# Patient Record
Sex: MALE | Race: WHITE | Employment: OTHER | URBAN - METROPOLITAN AREA
[De-identification: names, ages, dates, MRNs, and addresses within clinical notes are randomized per-mention and may not be internally consistent; named-entity substitution may affect disease eponyms.]

---

## 2017-01-27 ENCOUNTER — APPOINTMENT (OUTPATIENT)
Dept: GENERAL RADIOLOGY | Age: 62
End: 2017-01-27
Attending: EMERGENCY MEDICINE
Payer: COMMERCIAL

## 2017-01-27 ENCOUNTER — HOSPITAL ENCOUNTER (EMERGENCY)
Age: 62
Discharge: HOME | End: 2017-01-27
Attending: EMERGENCY MEDICINE | Admitting: EMERGENCY MEDICINE
Payer: COMMERCIAL

## 2017-01-27 VITALS
OXYGEN SATURATION: 96 % | HEIGHT: 72 IN | WEIGHT: 236.33 LBS | BODY MASS INDEX: 32.01 KG/M2 | HEART RATE: 74 BPM | SYSTOLIC BLOOD PRESSURE: 140 MMHG | DIASTOLIC BLOOD PRESSURE: 91 MMHG | RESPIRATION RATE: 16 BRPM | TEMPERATURE: 98.3 F

## 2017-01-27 DIAGNOSIS — R00.2 PALPITATIONS: Primary | ICD-10-CM

## 2017-01-27 LAB
ALBUMIN SERPL BCP-MCNC: 3.8 G/DL (ref 3.5–5)
ALBUMIN/GLOB SERPL: 1.1 {RATIO} (ref 1.1–2.2)
ALP SERPL-CCNC: 52 U/L (ref 45–117)
ALT SERPL-CCNC: 25 U/L (ref 12–78)
ANION GAP BLD CALC-SCNC: 7 MMOL/L (ref 5–15)
AST SERPL W P-5'-P-CCNC: 16 U/L (ref 15–37)
ATRIAL RATE: 79 BPM
BASOPHILS # BLD AUTO: 0 K/UL (ref 0–0.1)
BASOPHILS # BLD: 0 % (ref 0–1)
BILIRUB SERPL-MCNC: 0.6 MG/DL (ref 0.2–1)
BUN SERPL-MCNC: 18 MG/DL (ref 6–20)
BUN/CREAT SERPL: 20 (ref 12–20)
CALCIUM SERPL-MCNC: 8.4 MG/DL (ref 8.5–10.1)
CALCULATED P AXIS, ECG09: 18 DEGREES
CALCULATED R AXIS, ECG10: -26 DEGREES
CALCULATED T AXIS, ECG11: 17 DEGREES
CHLORIDE SERPL-SCNC: 109 MMOL/L (ref 97–108)
CK SERPL-CCNC: 129 U/L (ref 39–308)
CO2 SERPL-SCNC: 28 MMOL/L (ref 21–32)
CREAT SERPL-MCNC: 0.91 MG/DL (ref 0.7–1.3)
D DIMER PPP FEU-MCNC: 0.36 MG/L FEU (ref 0–0.65)
DIAGNOSIS, 93000: NORMAL
EOSINOPHIL # BLD: 0.1 K/UL (ref 0–0.4)
EOSINOPHIL NFR BLD: 3 % (ref 0–7)
ERYTHROCYTE [DISTWIDTH] IN BLOOD BY AUTOMATED COUNT: 12.9 % (ref 11.5–14.5)
GLOBULIN SER CALC-MCNC: 3.4 G/DL (ref 2–4)
GLUCOSE SERPL-MCNC: 88 MG/DL (ref 65–100)
HCT VFR BLD AUTO: 45.2 % (ref 36.6–50.3)
HGB BLD-MCNC: 14.8 G/DL (ref 12.1–17)
LYMPHOCYTES # BLD AUTO: 33 % (ref 12–49)
LYMPHOCYTES # BLD: 1.5 K/UL (ref 0.8–3.5)
MCH RBC QN AUTO: 29.8 PG (ref 26–34)
MCHC RBC AUTO-ENTMCNC: 32.7 G/DL (ref 30–36.5)
MCV RBC AUTO: 90.9 FL (ref 80–99)
MONOCYTES # BLD: 0.4 K/UL (ref 0–1)
MONOCYTES NFR BLD AUTO: 9 % (ref 5–13)
NEUTS SEG # BLD: 2.5 K/UL (ref 1.8–8)
NEUTS SEG NFR BLD AUTO: 55 % (ref 32–75)
P-R INTERVAL, ECG05: 190 MS
PLATELET # BLD AUTO: 226 K/UL (ref 150–400)
POTASSIUM SERPL-SCNC: 4.5 MMOL/L (ref 3.5–5.1)
PROT SERPL-MCNC: 7.2 G/DL (ref 6.4–8.2)
Q-T INTERVAL, ECG07: 348 MS
QRS DURATION, ECG06: 94 MS
QTC CALCULATION (BEZET), ECG08: 399 MS
RBC # BLD AUTO: 4.97 M/UL (ref 4.1–5.7)
SODIUM SERPL-SCNC: 144 MMOL/L (ref 136–145)
T4 FREE SERPL-MCNC: 0.9 NG/DL (ref 0.8–1.5)
TROPONIN I SERPL-MCNC: <0.04 NG/ML
TSH SERPL DL<=0.05 MIU/L-ACNC: 1.68 UIU/ML (ref 0.36–3.74)
VENTRICULAR RATE, ECG03: 79 BPM
WBC # BLD AUTO: 4.5 K/UL (ref 4.1–11.1)

## 2017-01-27 PROCEDURE — 82550 ASSAY OF CK (CPK): CPT

## 2017-01-27 PROCEDURE — 36415 COLL VENOUS BLD VENIPUNCTURE: CPT

## 2017-01-27 PROCEDURE — 84439 ASSAY OF FREE THYROXINE: CPT

## 2017-01-27 PROCEDURE — 71020 XR CHEST PA LAT: CPT

## 2017-01-27 PROCEDURE — 80053 COMPREHEN METABOLIC PANEL: CPT

## 2017-01-27 PROCEDURE — 99284 EMERGENCY DEPT VISIT MOD MDM: CPT

## 2017-01-27 PROCEDURE — 84443 ASSAY THYROID STIM HORMONE: CPT

## 2017-01-27 PROCEDURE — 93005 ELECTROCARDIOGRAM TRACING: CPT

## 2017-01-27 PROCEDURE — 85025 COMPLETE CBC W/AUTO DIFF WBC: CPT

## 2017-01-27 PROCEDURE — 85379 FIBRIN DEGRADATION QUANT: CPT

## 2017-01-27 PROCEDURE — 84484 ASSAY OF TROPONIN QUANT: CPT

## 2017-01-27 RX ORDER — METOPROLOL TARTRATE 25 MG/1
12.5 TABLET, FILM COATED ORAL 2 TIMES DAILY
Qty: 30 TAB | Refills: 0 | Status: SHIPPED | OUTPATIENT
Start: 2017-01-27 | End: 2020-08-01

## 2017-01-27 NOTE — ED NOTES
Patient to ED room from triage with complaint of \"palpatations\" for the past two years patient reports that they are becoming more frequent and that they keep him awake at night. Patient also reports dyspnea when laying flat.  Patient is a patient of Dr Erik Loyd, cardiologist.

## 2017-01-27 NOTE — ED PROVIDER NOTES
HPI Comments: 59 yo male with hx of tobacco use is sent to ED by PCP for palpitations. He has had these palpitations for 2 years, but they have gotten worse over the last few days. Pt reports intermittent chest pain over the last few days, but denies any currently. He has seen cardiology before and states that he has had a normal echo and stress test.  He has known PVCs, which have been though to be cause. Pt is not on a beta blocker, but has tried his wife's metoprolol and had relief with this. The history is provided by the patient. Past Medical History:   Diagnosis Date    Esophageal disorder        Past Surgical History:   Procedure Laterality Date    Pr ligmt revisn,knee,intra/extra-art           No family history on file. Social History     Social History    Marital status:      Spouse name: N/A    Number of children: N/A    Years of education: N/A     Occupational History    Not on file. Social History Main Topics    Smoking status: Former Smoker     Packs/day: 3.00     Years: 20.00     Quit date: 12/2/1986    Smokeless tobacco: Never Used    Alcohol use 3.0 oz/week     6 Cans of beer per week      Comment: occas    Drug use: No    Sexual activity: Yes     Partners: Female     Other Topics Concern    Not on file     Social History Narrative         ALLERGIES: Review of patient's allergies indicates no known allergies. Review of Systems   Constitutional: Negative for chills. HENT: Negative for congestion and sore throat. Eyes: Negative for pain. Respiratory: Shortness of breath: orthopnea. Gastrointestinal: Negative for abdominal distention, abdominal pain, nausea and vomiting. Endocrine: Negative for cold intolerance. Genitourinary: Negative for difficulty urinating and dysuria. Musculoskeletal: Negative for arthralgias. Skin: Negative for color change. Psychiatric/Behavioral: Negative for confusion.        Vitals:    01/27/17 0930   BP: (!) 184/107   Pulse: 88   Resp: 16   Temp: 98.3 °F (36.8 °C)   SpO2: 98%   Weight: 107.2 kg (236 lb 5.3 oz)   Height: 6' (1.829 m)            Physical Exam   Constitutional: He is oriented to person, place, and time. He appears well-developed and well-nourished. No distress. HENT:   Head: Normocephalic and atraumatic. Eyes: Conjunctivae and EOM are normal. Pupils are equal, round, and reactive to light. Neck: Normal range of motion. Neck supple. No tracheal deviation present. Cardiovascular:   Irregular rhythm, no m/r/g   Pulmonary/Chest: Effort normal. No respiratory distress. He has no wheezes. He has no rales. Abdominal: Soft. There is no tenderness. There is no rebound. Musculoskeletal: Normal range of motion. Neurological: He is alert and oriented to person, place, and time. Skin: Skin is dry. He is not diaphoretic. Vitals reviewed. MDM  Number of Diagnoses or Management Options     Amount and/or Complexity of Data Reviewed  Clinical lab tests: ordered and reviewed  Tests in the radiology section of CPT®: ordered and reviewed  Tests in the medicine section of CPT®: ordered and reviewed      ED Course   ED EKG interpretation:  Rhythm: normal sinus rhythm; and regular . Rate (approx.): 79; Axis: left axis deviation; P wave: normal; QRS interval: normal ; ST/T wave: normal; Other findings: multiple PVCs noted. This EKG was interpreted by Raya Gonzalez,ED Provider. Procedures    EKG interpretation: (Preliminary)  09:39  Rhythm: sinus rhythm with sinus arrhythmia and PVC's; and regular . Rate (approx.): 79; Axis: normal; GA interval: normal; QRS interval: normal ; ST/T wave: normal; Other findings: normal.  Written by Vijaya Ireland ED Scribe, as dictated by Raj Peña MD    12:03 PM  Discussed lab results with pt, no gross abnormalities. Attempted to reach patient's cardiologist Dr. Glen Corea and spoke with Dr. Pretty Scales with same practice regarding management.   At this point we have agreed to start patient on low dose metoprolol 12.5mg BID and he will follow up in clinic. LABORATORY TESTS:  Recent Results (from the past 12 hour(s))   EKG, 12 LEAD, INITIAL    Collection Time: 01/27/17  9:39 AM   Result Value Ref Range    Ventricular Rate 79 BPM    Atrial Rate 79 BPM    P-R Interval 190 ms    QRS Duration 94 ms    Q-T Interval 348 ms    QTC Calculation (Bezet) 399 ms    Calculated P Axis 18 degrees    Calculated R Axis -26 degrees    Calculated T Axis 17 degrees    Diagnosis       Sinus rhythm with sinus arrhythmia with occasional premature ventricular   complexes  Moderate voltage criteria for LVH, may be normal variant  When compared with ECG of 10-MAR-2012 11:33,  premature ventricular complexes are now present     TROPONIN I    Collection Time: 01/27/17 10:23 AM   Result Value Ref Range    Troponin-I, Qt. <0.04 <0.05 ng/mL   CK W/ REFLX CKMB    Collection Time: 01/27/17 10:23 AM   Result Value Ref Range     39 - 007 U/L   METABOLIC PANEL, COMPREHENSIVE    Collection Time: 01/27/17 10:23 AM   Result Value Ref Range    Sodium 144 136 - 145 mmol/L    Potassium 4.5 3.5 - 5.1 mmol/L    Chloride 109 (H) 97 - 108 mmol/L    CO2 28 21 - 32 mmol/L    Anion gap 7 5 - 15 mmol/L    Glucose 88 65 - 100 mg/dL    BUN 18 6 - 20 MG/DL    Creatinine 0.91 0.70 - 1.30 MG/DL    BUN/Creatinine ratio 20 12 - 20      GFR est AA >60 >60 ml/min/1.73m2    GFR est non-AA >60 >60 ml/min/1.73m2    Calcium 8.4 (L) 8.5 - 10.1 MG/DL    Bilirubin, total 0.6 0.2 - 1.0 MG/DL    ALT 25 12 - 78 U/L    AST 16 15 - 37 U/L    Alk.  phosphatase 52 45 - 117 U/L    Protein, total 7.2 6.4 - 8.2 g/dL    Albumin 3.8 3.5 - 5.0 g/dL    Globulin 3.4 2.0 - 4.0 g/dL    A-G Ratio 1.1 1.1 - 2.2     CBC WITH AUTOMATED DIFF    Collection Time: 01/27/17 10:23 AM   Result Value Ref Range    WBC 4.5 4.1 - 11.1 K/uL    RBC 4.97 4.10 - 5.70 M/uL    HGB 14.8 12.1 - 17.0 g/dL    HCT 45.2 36.6 - 50.3 %    MCV 90.9 80.0 - 99.0 FL MCH 29.8 26.0 - 34.0 PG    MCHC 32.7 30.0 - 36.5 g/dL    RDW 12.9 11.5 - 14.5 %    PLATELET 818 615 - 957 K/uL    NEUTROPHILS 55 32 - 75 %    LYMPHOCYTES 33 12 - 49 %    MONOCYTES 9 5 - 13 %    EOSINOPHILS 3 0 - 7 %    BASOPHILS 0 0 - 1 %    ABS. NEUTROPHILS 2.5 1.8 - 8.0 K/UL    ABS. LYMPHOCYTES 1.5 0.8 - 3.5 K/UL    ABS. MONOCYTES 0.4 0.0 - 1.0 K/UL    ABS. EOSINOPHILS 0.1 0.0 - 0.4 K/UL    ABS. BASOPHILS 0.0 0.0 - 0.1 K/UL   D DIMER    Collection Time: 01/27/17 10:23 AM   Result Value Ref Range    D-dimer 0.36 0.00 - 0.65 mg/L FEU   TSH, 3RD GENERATION    Collection Time: 01/27/17 10:23 AM   Result Value Ref Range    TSH 1.68 0.36 - 3.74 uIU/mL       IMAGING RESULTS:  XR CHEST PA LAT   Final Result          MEDICATIONS GIVEN:  Medications - No data to display    IMPRESSION:  Palpitations    PLAN:  1. Metoprolol 12.5 mg BID   Current Discharge Medication List      CONTINUE these medications which have NOT CHANGED    Details   pantoprazole (PROTONIX) 20 mg tablet Take 20 mg by mouth daily. LORazepam (ATIVAN) 0.5 mg tablet Take 0.5 mg by mouth as needed. 2.   Follow-up Information     None        Return to ED if worse               ------------------------------------------  Begin Attending Documentation  ------------------------------------------    Attending Attestation: I was not present during the patient's evaluation by the resident. I personally evaluated the patient including the history and physical. I have read the resident's note and agree with their history, physical and plan. HPI: Manju Marlow is a 58 y.o. male who presents ambulatory to [facility] ED with CC of palpitations x \"years,\" becoming constant x \"a few days. \" He also c/o orthopnea x \"a few days. \" Pt reports he was referred to ED by his PCP today.  He states he was informed to stop drinking caffeine after being evaluated by his Cardiologist for similar symptoms; pt reports he has not had caffeine x ~1 month without significant improvement. He reports intermittent dyspnea x last night. Pt reports hx of normal stress test, echo and Holter monitor by cardiology (Dr. Dagoberto Lunsford). 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 Pt denies hx of DVT or PE. He denies recent surgery. Pt denies hx of HTN, or currently taking any blood pressure medications. He denies ABD pain, N/V/D, CP, cough, or hemoptysis. PCP: Maribeth Cm MD   Cardiology: Dr. Dagoberto Lunsford    There are no other complaints, changes, or physical findings at this time. PE:  Gen: NAD, WD/WN   Heart: nl S1, S2, no m/r/g   Lungs: CTAB, no w/r/r   Abd: soft, nttp, ND   Ext: no swelling, no calf tenderness   Skin: no rashes   Neuro: grossly intact, no focal deficits    Assessment: Patient presents to ED with palpitations. PVCs noted on EKG. He has had unremarkable work up with cardiology including stress test and echo. He denies chest pain. Labs including Pinky and d-dimer unremarkable (do not suspect PE). Reviewed telemetry - NSR throughout ED course. Will start low dose metoprolol and have patient follow up with cardiology.     Diagnosis: Palpitations, PVCs    Katharina Garcia MD.    ------------------------------------------  End Attending Documentation  ------------------------------------------

## 2017-01-27 NOTE — DISCHARGE INSTRUCTIONS
Palpitations: Care Instructions  Your Care Instructions    Heart palpitations are the uncomfortable sensation that your heart is beating fast or irregularly. You might feel pounding or fluttering in your chest. It might feel like your heart is skipping a beat. Although palpitations may be caused by a heart problem, they also occur because of stress, fatigue, or use of alcohol, caffeine, or nicotine. Many medicines, including diet pills, antihistamines, decongestants, and some herbal products, can cause heart palpitations. Nearly everyone has palpitations from time to time. Depending on your symptoms, your doctor may need to do more tests to try to find the cause of your palpitations. Follow-up care is a key part of your treatment and safety. Be sure to make and go to all appointments, and call your doctor if you are having problems. It's also a good idea to know your test results and keep a list of the medicines you take. How can you care for yourself at home? · Avoid caffeine, nicotine, and excess alcohol. · Do not take illegal drugs, such as methamphetamines and cocaine. · Do not take weight loss or diet medicines unless you talk with your doctor first.  · Get plenty of sleep. · Do not overeat. · If you have palpitations again, take deep breaths and try to relax. This may slow a racing heart. · If you start to feel lightheaded, lie down to avoid injuries that might result if you pass out and fall down. · Keep a record of your palpitations and bring it to your next doctor's appointment. Write down:  ¨ The date and time. ¨ Your pulse. (If your heart is beating fast, it may be hard to count your pulse.)  ¨ What you were doing when the palpitations started. ¨ How long the palpitations lasted. ¨ Any other symptoms. · If an activity causes palpitations, slow down or stop. Talk to your doctor before you do that activity again. · Take your medicines exactly as prescribed.  Call your doctor if you think you are having a problem with your medicine. When should you call for help? Call 911 anytime you think you may need emergency care. For example, call if:  · You passed out (lost consciousness). · You have symptoms of a heart attack. These may include:  ¨ Chest pain or pressure, or a strange feeling in the chest.  ¨ Sweating. ¨ Shortness of breath. ¨ Pain, pressure, or a strange feeling in the back, neck, jaw, or upper belly or in one or both shoulders or arms. ¨ Lightheadedness or sudden weakness. ¨ A fast or irregular heartbeat. After you call 911, the  may tell you to chew 1 adult-strength or 2 to 4 low-dose aspirin. Wait for an ambulance. Do not try to drive yourself. · You have symptoms of a stroke. These may include:  ¨ Sudden numbness, tingling, weakness, or loss of movement in your face, arm, or leg, especially on only one side of your body. ¨ Sudden vision changes. ¨ Sudden trouble speaking. ¨ Sudden confusion or trouble understanding simple statements. ¨ Sudden problems with walking or balance. ¨ A sudden, severe headache that is different from past headaches. Call your doctor now or seek immediate medical care if:  · You have heart palpitations and:  ¨ Are dizzy or lightheaded, or you feel like you may faint. ¨ Have new or increased shortness of breath. Watch closely for changes in your health, and be sure to contact your doctor if:  · You continue to have heart palpitations. Where can you learn more? Go to http://tabatha-lisbeth.info/. Enter R508 in the search box to learn more about \"Palpitations: Care Instructions. \"  Current as of: January 27, 2016  Content Version: 11.1  © 8615-8663 [employer]. Care instructions adapted under license by [employer] (which disclaims liability or warranty for this information).  If you have questions about a medical condition or this instruction, always ask your healthcare professional. [employer] disclaims any warranty or liability for your use of this information.

## 2017-02-24 ENCOUNTER — OFFICE VISIT (OUTPATIENT)
Dept: CARDIOLOGY CLINIC | Age: 62
End: 2017-02-24

## 2017-02-24 VITALS
OXYGEN SATURATION: 94 % | WEIGHT: 234 LBS | HEART RATE: 72 BPM | DIASTOLIC BLOOD PRESSURE: 72 MMHG | BODY MASS INDEX: 31.69 KG/M2 | SYSTOLIC BLOOD PRESSURE: 110 MMHG | RESPIRATION RATE: 18 BRPM | HEIGHT: 72 IN

## 2017-02-24 DIAGNOSIS — R00.2 HEART PALPITATIONS: Primary | ICD-10-CM

## 2017-02-24 DIAGNOSIS — I49.3 VENTRICULAR PREMATURE CONTRACTIONS: ICD-10-CM

## 2017-02-24 DIAGNOSIS — I49.1 ATRIAL PREMATURE CONTRACTIONS: ICD-10-CM

## 2017-02-24 DIAGNOSIS — F15.10 CAFFEINE ABUSE, CONTINUOUS (HCC): ICD-10-CM

## 2017-02-24 NOTE — PROGRESS NOTES
Chief Complaint   Patient presents with    Appointment     6 mo appt. C/O palps rest or exertion. Not taking his Metoprolol.

## 2017-02-24 NOTE — PROGRESS NOTES
[address] [place] [address]        [phone]                             NEW PATIENT HPI/FOLLOW-UP    NAME:  Chad White   :   1955   MRN:   [id number]   PCP:  Sandoval Ding MD           Subjective: The patient is a 58y.o. year old male  who returns for a routine follow-up. Since the last visit last month, patient sent to ED [address] for worsening \"skipping,fluttering\" palpitations for which he was rx'ed low dose BB 17 as he had taken one of his wife's doses and noted some improvement. 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 Since  or longer, short and longterm monitoring have revealed APC's and PVC's not necessarily corresponding with symptoms. Was advised at last visit to call if not better after stopping caffeine. If not then advised we would try BB rx which would not only help palpitations but anxiety as well. Denies change in exercise tolerance, chest pain, edema, medication intolerance, +shortness of breath, PND/orthopnea wheezing, sputum, syncope, dizziness or light headedness. Review of Systems  General: Pt denies excessive weight gain or loss. Pt is able to conduct ADL's. Respiratory: +shortness of breath,+ PARKS, wheezing or stridor.   Cardiovascular: Denies precordial pain, +palpitations, edema or PND  Gastrointestinal: Denies poor appetite, indigestion, abdominal pain or blood in stool  Peripheral vascular: Denies claudication, leg cramps  Neuropsychiatric: Denies paresthesias,tingling,numbness,anxiety,depression,fatigue  Musculoskeletal: Denies pain,tenderness, soreness,swelling      Past Medical History:   Diagnosis Date    Esophageal disorder      Patient Active Problem List    Diagnosis Date Noted    Caffeine abuse, continuous 2016    Atrial premature contractions 10/27/2016    Ventricular premature contractions 10/27/2016    Ischemic chest pain 08/13/2015    Heart palpitations 08/13/2015      Past Surgical History:   Procedure Laterality Date    LIGMT REVISN,KNEE,INTRA/EXTRA-ART       No Known Allergies   No family history on file. Social History     Social History    Marital status:      Spouse name: N/A    Number of children: N/A    Years of education: N/A     Occupational History    Not on file. Social History Main Topics    Smoking status: Former Smoker     Packs/day: 3.00     Years: 20.00     Quit date: 12/2/1986    Smokeless tobacco: Never Used    Alcohol use 3.0 oz/week     6 Cans of beer per week      Comment: occas    Drug use: No    Sexual activity: Yes     Partners: Female     Other Topics Concern    Not on file     Social History Narrative      Current Outpatient Prescriptions   Medication Sig    pantoprazole (PROTONIX) 20 mg tablet Take 20 mg by mouth daily.  LORazepam (ATIVAN) 0.5 mg tablet Take 0.5 mg by mouth as needed.  metoprolol tartrate (LOPRESSOR) 25 mg tablet Take 0.5 Tabs by mouth two (2) times a day. Indications: palpitations     No current facility-administered medications for this visit. I have reviewed the nurses notes, vitals, problem list, allergy list, medical history, family medical, social history and medications. Objective:     Physical Exam:     Vitals:    02/24/17 1423   BP: 110/72   Pulse: 72   Resp: 18   SpO2: 94%   Weight: 234 lb (106.1 kg)   Height: 6' (1.829 m)    Body mass index is 31.74 kg/(m^2). General: Well developed, in no acute distress. HEENT: No carotid bruits, no JVD, trach is midline. Heart:  Normal S1/S2 negative S3 or S4. Regular, no murmur, gallop or rub.   Respiratory: Clear bilaterally, no wheezing or rales  Abdomen:   Soft, non-tender, bowel sounds are active.   Extremities:  No edema, normal cap refill, no cyanosis. Neuro: A&Ox3, speech clear, gait stable. Skin: Skin color is normal. No rashes or lesions. No diaphoresis. Vascular: 2+ pulses symmetric in all extremities        Data Review:       Cardiographics:    EKG: NSR,WNL    Cardiology Labs:    Results for orders placed or performed during the hospital encounter of 01/27/17   EKG, 12 LEAD, INITIAL   Result Value Ref Range    Ventricular Rate 79 BPM    Atrial Rate 79 BPM    P-R Interval 190 ms    QRS Duration 94 ms    Q-T Interval 348 ms    QTC Calculation (Bezet) 399 ms    Calculated P Axis 18 degrees    Calculated R Axis -26 degrees    Calculated T Axis 17 degrees    Diagnosis       Sinus rhythm with sinus arrhythmia with occasional premature ventricular   complexes  Moderate voltage criteria for LVH, may be normal variant  When compared with ECG of 10-MAR-2012 11:33,  premature ventricular complexes are now present  Confirmed by Sean Wolfe ([id number]) on 1/27/2017 12:50:05 PM     Results for orders placed or performed in visit on 08/13/15   CARDIAC HOLTER MONITOR, 24 HOURS    Narrative    ECG Monitor/24 hours, Complete    Reason for Holter Monitor   PALPITATIONS    Heartbeat    Slowest 51  Average 74  Fastest  118          Results:   Underlying Rhythm: Normal sinus rhythm      Atrial Arrhythmias: premature atrial contractions; occasional             AV Conduction: normal    Ventricular Arrhythmias: premature ventricular contractions;  occasional     ST Segment Analysis:non-specific changes     Symptom Correlation:  none    Comment:   occasional PAC's, PVC's.      James Newman MD                 No results found for: CHOL, CHOLX, CHLST, CHOLV, [id number], HDL, LDL, DLDL, LDLC, DLDLP, TGL, TGLX, TRIGL, [id number], TRIGP, CHHD, CHHDX    Lab Results   Component Value Date/Time    Sodium 144 01/27/2017 10:23 AM    Potassium 4.5 01/27/2017 10:23 AM    Chloride 109 01/27/2017 10:23 AM    CO2 28 01/27/2017 10:23 AM    Anion gap 7 01/27/2017 10:23 AM    Glucose 88 01/27/2017 10:23 AM    BUN 18 01/27/2017 10:23 AM    Creatinine 0.91 01/27/2017 10:23 AM BUN/Creatinine ratio 20 01/27/2017 10:23 AM    GFR est AA >60 01/27/2017 10:23 AM    GFR est non-AA >60 01/27/2017 10:23 AM    Calcium 8.4 01/27/2017 10:23 AM    Bilirubin, total 0.6 01/27/2017 10:23 AM    AST (SGOT) 16 01/27/2017 10:23 AM    Alk. phosphatase 52 01/27/2017 10:23 AM    Protein, total 7.2 01/27/2017 10:23 AM    Albumin 3.8 01/27/2017 10:23 AM    Globulin 3.4 01/27/2017 10:23 AM    A-G Ratio 1.1 01/27/2017 10:23 AM    ALT (SGPT) 25 01/27/2017 10:23 AM          Assessment:       ICD-10-CM ICD-9-CM    1. Heart palpitations R00.2 785.1 AMB POC EKG ROUTINE W/ 12 LEADS, INTER & REP      ECG EVENT RECORD MONITORING SET-UP      STRESS TEST CARDIAC   2. Atrial premature contractions I49.1 427.61 ECG EVENT RECORD MONITORING SET-UP      STRESS TEST CARDIAC   3. Ventricular premature contractions I49.3 427.69 ECG EVENT RECORD MONITORING SET-UP      STRESS TEST CARDIAC   4. Caffeine abuse, continuous F15.10 305.91 ECG EVENT RECORD MONITORING SET-UP      STRESS TEST CARDIAC         Discussion: Patient presents at this time with much anxiety regarding more frequent palpitations since last visit 2 months ago. Was seen in ED ED [facility] and started on low dose BB 1/27/17 but has not taken consistently. Have asked to hold off til routine stress test and EM as episodes unpredictable and not every day usually. Has known benign APC's and PVC's which it appears have been of concern. Will likely be maintained on BB dose adjusted as tolerated. Plan: 1. Continue same meds. Lipid profile and labs followed by PCP. 2.Encouraged to exercise to tolerance and follow low fat, low cholesterol, low sodium predominantly Plant-based (consider Mediterranean) diet. Call with questions or concerns. Will follow up any test results by phone and/or f/u here in office if needed. Ted Mason 3.Follow up: 2-3 WEEKS    I have discussed the diagnosis with the patient and the intended plan as seen in the above orders.   The patient has received an after-visit summary and questions were answered concerning future plans. I have discussed any concerning medication side effects and warnings with the patient as well.     Liliana Zarate MD  2/24/2017

## 2017-03-02 ENCOUNTER — CLINICAL SUPPORT (OUTPATIENT)
Dept: CARDIOLOGY CLINIC | Age: 62
End: 2017-03-02

## 2017-03-02 ENCOUNTER — OFFICE VISIT (OUTPATIENT)
Dept: CARDIOLOGY CLINIC | Age: 62
End: 2017-03-02

## 2017-03-02 DIAGNOSIS — I49.3 VENTRICULAR PREMATURE CONTRACTIONS: ICD-10-CM

## 2017-03-02 DIAGNOSIS — F15.10 CAFFEINE ABUSE, CONTINUOUS (HCC): ICD-10-CM

## 2017-03-02 DIAGNOSIS — I49.1 ATRIAL PREMATURE CONTRACTIONS: ICD-10-CM

## 2017-03-02 DIAGNOSIS — R00.2 HEART PALPITATIONS: ICD-10-CM

## 2017-03-03 ENCOUNTER — TELEPHONE (OUTPATIENT)
Dept: CARDIOLOGY CLINIC | Age: 62
End: 2017-03-03

## 2017-03-03 NOTE — TELEPHONE ENCOUNTER
Spoke with patient. Verified patient with two patient identifiers. Advised stress test normal.  Patient verbalized understanding.

## 2017-03-24 ENCOUNTER — OFFICE VISIT (OUTPATIENT)
Dept: CARDIOLOGY CLINIC | Age: 62
End: 2017-03-24

## 2017-03-24 ENCOUNTER — TELEPHONE (OUTPATIENT)
Dept: CARDIOLOGY CLINIC | Age: 62
End: 2017-03-24

## 2017-03-24 VITALS
HEIGHT: 72 IN | SYSTOLIC BLOOD PRESSURE: 110 MMHG | RESPIRATION RATE: 18 BRPM | DIASTOLIC BLOOD PRESSURE: 80 MMHG | OXYGEN SATURATION: 98 % | BODY MASS INDEX: 31.29 KG/M2 | WEIGHT: 231 LBS | HEART RATE: 56 BPM

## 2017-03-24 DIAGNOSIS — I49.3 SYMPTOMATIC PREMATURE VENTRICULAR CONTRACTIONS: ICD-10-CM

## 2017-03-24 DIAGNOSIS — Z72.0 TOBACCO USE: ICD-10-CM

## 2017-03-24 DIAGNOSIS — I49.1 PREMATURE ATRIAL CONTRACTIONS: ICD-10-CM

## 2017-03-24 DIAGNOSIS — F15.10 CAFFEINE ABUSE, CONTINUOUS (HCC): ICD-10-CM

## 2017-03-24 DIAGNOSIS — R00.2 HEART PALPITATIONS: Primary | ICD-10-CM

## 2017-03-24 NOTE — PROGRESS NOTES
[address] [place] [address]        [phone]                             NEW PATIENT HPI/FOLLOW-UP    NAME:  Sylvia Rivera   :   1955   MRN:   [id number]   PCP:  Martine Bullock MD           Subjective: The patient is a 58y.o. year old male  who returns for a routine follow-up. Since the last visit, patient reports less frequent palpitations since reduction in caffeine consumption. .Stress test normal. HM revealed marked SB into 40's while sleeping. Denies change in exercise tolerance, chest pain, edema, medication intolerance, shortness of breath, PND/orthopnea wheezing, sputum, syncope, dizziness or light headedness. Doing satisfactorily. Review of Systems  General: Pt denies excessive weight gain or loss. Pt is able to conduct ADL's. Respiratory: Denies shortness of breath, PARKS, wheezing or stridor. Cardiovascular: Denies precordial pain, +palpitations, edema or PND  Gastrointestinal: Denies poor appetite, indigestion, abdominal pain or blood in stool  Peripheral vascular: Denies claudication, leg cramps  Neuropsychiatric: Denies paresthesias,tingling,numbness,anxiety,depression,fatigue  Musculoskeletal: Denies pain,tenderness, soreness,swelling      Past Medical History:   Diagnosis Date    Esophageal disorder      Patient Active Problem List    Diagnosis Date Noted    Caffeine abuse, continuous 2016    Atrial premature contractions 10/27/2016    Ventricular premature contractions 10/27/2016    Ischemic chest pain 2015    Heart palpitations 2015      Past Surgical History:   Procedure Laterality Date    LIGMT REVISN,KNEE,INTRA/EXTRA-ART       No Known Allergies   No family history on file. Social History     Social History    Marital status:      Spouse name: N/A    Number of children: N/A    Years of education: N/A     Occupational History    Not on file.      Social History Main Topics    Smoking status: Former Smoker Packs/day: 3.00     Years: 20.00     Quit date: 12/2/1986    Smokeless tobacco: Never Used    Alcohol use 3.0 oz/week     6 Cans of beer per week      Comment: occas    Drug use: No    Sexual activity: Yes     Partners: Female     Other Topics Concern    Not on file     Social History Narrative      Current Outpatient Prescriptions   Medication Sig    pantoprazole (PROTONIX) 20 mg tablet Take 20 mg by mouth daily.  LORazepam (ATIVAN) 0.5 mg tablet Take 0.5 mg by mouth as needed.  metoprolol tartrate (LOPRESSOR) 25 mg tablet Take 0.5 Tabs by mouth two (2) times a day. Indications: palpitations     No current facility-administered medications for this visit. I have reviewed the nurses notes, vitals, problem list, allergy list, medical history, family medical, social history and medications. Objective:     Physical Exam:     Vitals:    03/24/17 1311   BP: 110/80   Pulse: (!) 56   Resp: 18   SpO2: 98%   Weight: 231 lb (104.8 kg)   Height: 6' (1.829 m)    Body mass index is 31.33 kg/(m^2). General: Well developed, in no acute distress. HEENT: No carotid bruits, no JVD, trach is midline. Heart:  Normal S1/S2 negative S3 or S4. Regular, no murmur, gallop or rub.   Respiratory: Clear bilaterally, no wheezing or rales  Abdomen:   Soft, non-tender, bowel sounds are active.   Extremities:  No edema, normal cap refill, no cyanosis. Neuro: A&Ox3, speech clear, gait stable. Skin: Skin color is normal. No rashes or lesions. No diaphoresis.   Vascular: 2+ pulses symmetric in all extremities        Data Review:       Cardiographics:    Cardiology Labs:    Results for orders placed or performed during the hospital encounter of 01/27/17   EKG, 12 LEAD, INITIAL   Result Value Ref Range    Ventricular Rate 79 BPM    Atrial Rate 79 BPM    P-R Interval 190 ms    QRS Duration 94 ms    Q-T Interval 348 ms    QTC Calculation (Bezet) 399 ms    Calculated P Axis 18 degrees    Calculated R Axis -26 degrees    Calculated T Axis 17 degrees    Diagnosis       Sinus rhythm with sinus arrhythmia with occasional premature ventricular   complexes  Moderate voltage criteria for LVH, may be normal variant  When compared with ECG of 10-MAR-2012 11:33,  premature ventricular complexes are now present  Confirmed by [address] ([id number]) on 1/27/2017 12:50:05 PM     Results for orders placed or performed in visit on 08/13/15   CARDIAC HOLTER MONITOR, 24 HOURS    Narrative    ECG Monitor/24 hours, Complete    Reason for Holter Monitor   PALPITATIONS    Heartbeat    Slowest 51  Average 74  Fastest  118          Results:   Underlying Rhythm: Normal sinus rhythm      Atrial Arrhythmias: premature atrial contractions; occasional             AV Conduction: normal    Ventricular Arrhythmias: premature ventricular contractions;  occasional     ST Segment Analysis:non-specific changes     Symptom Correlation:  none    Comment:   occasional PAC's, PVC's. Lluvia Watts MD                 No results found for: CHOL, CHOLX, CHLST, CHOLV, [id number], HDL, LDL, DLDL, LDLC, DLDLP, TGL, Sully Hasten, [id number], TRIGP, CHHD, CHHDX    Lab Results   Component Value Date/Time    Sodium 144 01/27/2017 10:23 AM    Potassium 4.5 01/27/2017 10:23 AM    Chloride 109 01/27/2017 10:23 AM    CO2 28 01/27/2017 10:23 AM    Anion gap 7 01/27/2017 10:23 AM    Glucose 88 01/27/2017 10:23 AM    BUN 18 01/27/2017 10:23 AM    Creatinine 0.91 01/27/2017 10:23 AM    BUN/Creatinine ratio 20 01/27/2017 10:23 AM    GFR est AA >60 01/27/2017 10:23 AM    GFR est non-AA >60 01/27/2017 10:23 AM    Calcium 8.4 01/27/2017 10:23 AM    Bilirubin, total 0.6 01/27/2017 10:23 AM    AST (SGOT) 16 01/27/2017 10:23 AM    Alk.  phosphatase 52 01/27/2017 10:23 AM    Protein, total 7.2 01/27/2017 10:23 AM    Albumin 3.8 01/27/2017 10:23 AM    Globulin 3.4 01/27/2017 10:23 AM    A-G Ratio 1.1 01/27/2017 10:23 AM    ALT (SGPT) 25 01/27/2017 10:23 AM Assessment:       ICD-10-CM ICD-9-CM    1. Heart palpitations R00.2 785.1    2. Symptomatic premature ventricular contractions I49.3 427.69    3. Premature atrial contractions--symptomatic I49.1 427.61    4. Caffeine abuse, continuous F15.10 305.91    5. Tobacco use Z72.0 305.1          Discussion: Patient presents at this time stable from a cardiac perspective. EM revealed APC's and PVC's--symptomatic. Frequency much less after stopping Caffeinated coffee consumption 2 weeks ago. Will hold off with BB as HR drops to as low as 44 bpm during sleep. States that he snores. Suspect ANA LAURA and he confirms that he was diagnosed about 20 yrs ago but wasn't able to tolerated CPAP. Have advised to discuss with  [facility] referral. Khushboo Keane should further resolve after Tx. Pleased with present status. Plan: 1. Continue same meds. Lipid profile and labs followed by PCP. 2.Encouraged to exercise to tolerance, lose weight, stop smoking and follow low fat, low cholesterol, low sodium predominantly Plant-based (consider Mediterranean) diet. Call with questions or concerns. Will follow up any test results by phone and/or f/u here in office if needed. Anthony Coelho 3.Follow up: 6 MONTHS or sooner if needed. I have discussed the diagnosis with the patient and the intended plan as seen in the above orders. The patient has received an after-visit summary and questions were answered concerning future plans. I have discussed any concerning medication side effects and warnings with the patient as well.     Laisha Eubanks MD  3/24/2017

## 2017-03-24 NOTE — TELEPHONE ENCOUNTER
----- Message from Reymundo Del Cid MD sent at 3/23/2017 10:16 PM EDT -----  Regarding: HM  SLOW HR DURING SLEEP    F/U TO DISCUSS    B

## 2019-05-26 ENCOUNTER — HOSPITAL ENCOUNTER (EMERGENCY)
Age: 64
Discharge: HOME | End: 2019-05-26
Attending: EMERGENCY MEDICINE
Payer: COMMERCIAL

## 2019-05-26 VITALS
BODY MASS INDEX: 31.47 KG/M2 | OXYGEN SATURATION: 99 % | DIASTOLIC BLOOD PRESSURE: 88 MMHG | HEIGHT: 72 IN | WEIGHT: 232.37 LBS | RESPIRATION RATE: 16 BRPM | SYSTOLIC BLOOD PRESSURE: 152 MMHG | HEART RATE: 88 BPM | TEMPERATURE: 99.6 F

## 2019-05-26 DIAGNOSIS — S90.851A FOREIGN BODY IN RIGHT FOOT, INITIAL ENCOUNTER: Primary | ICD-10-CM

## 2019-05-26 DIAGNOSIS — Z78.9 UP TO DATE WITH TETANUS TOXOID IMMUNIZATION: ICD-10-CM

## 2019-05-26 PROCEDURE — 99281 EMR DPT VST MAYX REQ PHY/QHP: CPT

## 2019-05-26 PROCEDURE — 75810000292 HC REMOVE FB FOOT

## 2019-05-26 PROCEDURE — 77030006402 HC SHOE PSTOP RIG DJOR -A

## 2019-05-26 RX ORDER — CIPROFLOXACIN 750 MG/1
750 TABLET, FILM COATED ORAL 2 TIMES DAILY
Qty: 20 TAB | Refills: 0 | Status: SHIPPED | OUTPATIENT
Start: 2019-05-26 | End: 2019-06-05

## 2019-05-26 RX ORDER — LIDOCAINE HYDROCHLORIDE 20 MG/ML
10 INJECTION, SOLUTION EPIDURAL; INFILTRATION; INTRACAUDAL; PERINEURAL
Status: DISCONTINUED | OUTPATIENT
Start: 2019-05-26 | End: 2019-05-26 | Stop reason: HOSPADM

## 2019-05-26 NOTE — DISCHARGE INSTRUCTIONS
Patient Education        Object in the Skin: Care Instructions  Your Care Instructions  Small objects (splinters) of wood, metal, glass, or plastic can become embedded in the skin. Thorns from [place] and other plants also can prick or become stuck in the skin. Splinters can cause an infection if they are not removed. Your doctor probably removed the object and cleaned the skin well. Your doctor may have given you antibiotics to prevent infection and a tetanus shot if you had not had one in the last 5 years or do not know when you had your last one. For a few days, you may have pain and itching in the wound where the object was removed. Follow-up care is a key part of your treatment and safety. Be sure to make and go to all appointments, and call your doctor if you are having problems. It's also a good idea to know your test results and keep a list of the medicines you take. How can you care for yourself at home? · If your doctor told you how to care for your wound, follow your doctor's instructions. If you did not get instructions, follow this general advice:  ? Wash the wound with clean water 2 times a day. Don't use hydrogen peroxide or alcohol, which can slow healing. ? You may cover the wound with a thin layer of petroleum jelly, such as Vaseline, and a nonstick bandage. ? Apply more petroleum jelly and replace the bandage as needed. · Your doctor may have used medicine to numb your skin. When it wears off, your pain may return. Take an over-the-counter pain medicine, such as acetaminophen (Tylenol), ibuprofen (Advil, Motrin), or naproxen (Aleve). Read and follow all instructions on the label. · Do not take two or more pain medicines at the same time unless the doctor told you to. Many pain medicines have acetaminophen, which is Tylenol. Too much acetaminophen (Tylenol) can be harmful. · If your doctor prescribed antibiotics, take them as directed. Do not stop taking them just because you feel better. You need to take the full course of antibiotics. · After 2 or 3 days, if your swelling is gone, apply a heating pad set on low or a warm cloth to your wound area. Some doctors suggest that you go back and forth between hot and cold. Put a thin cloth between the heating pad and your skin. · It may help to prop up the affected part of your body on a pillow anytime you sit or lie down during the next 3 days. Try to keep it above the level of your heart. This will help reduce swelling. · Your wound may itch or feel irritated. A little redness and swelling is normal. Do not scratch or rub the wound. When should you call for help? Call your doctor now or seek immediate medical care if:    · The skin near the wound is cool or pale or changes color.     · You have tingling, weakness, or numbness in the area near the wound.     · The wound starts to bleed, and blood soaks the bandage. Oozing small amounts of blood is normal.     · You have trouble moving a limb near the wound.     · You have signs of infection, such as:  ? Increased pain, swelling, warmth, or redness. ? Red streaks leading from the wound. ? Pus draining from the wound. ? A fever.    Watch closely for changes in your health, and be sure to contact your doctor if:    · You do not get better as expected. Where can you learn more? Go to http://tabatha-lisbeth.info/. Enter Jessica Sanches in the search box to learn more about \"Object in the Skin: Care Instructions. \"  Current as of: September 23, 2018  Content Version: 11.9  © 1784-1129 [employer] ( [employer] ). Care instructions adapted under license by [employer] (which disclaims liability or warranty for this information). If you have questions about a medical condition or this instruction, always ask your healthcare professional. [address] any warranty or liability for your use of this information.        Patient Education   Wound Care: After Your Visit to the Emergency Room  Your Care Instructions  The care you need depends on the type of wound you have. Taking good care of your wound at home will help it heal quickly and will reduce your chance of infection. Even though you have been released from the emergency room, you still need to watch for any problems. The doctor carefully checked you. But sometimes problems can develop later. If you have new symptoms, or if your symptoms do not get better, return to the emergency room or call your doctor right away. A visit to the emergency room is only one step in your treatment. Even if you feel better, you still need to do what your doctor recommends, such as going to all suggested follow-up appointments and taking medicines exactly as directed. This will help you recover and help prevent future problems. How can you care for yourself at home? · Clean the area with soap and water 2 times a day, or as your doctor tells you. Don't use hydrogen peroxide or alcohol, which can slow healing. ¨ Unless your doctor gives you other directions, cover the wound with a thin layer of antibiotic ointment, such as bacitracin, and a bandage. Do not use an ointment that contains neomycin, because it can irritate the skin. ¨ Apply more ointment and replace the bandage as your doctor tells you. ¨ If the bandage is stuck to a scab, soak it in warm water to soften the scab. This will make the bandage easier to remove. · Ask your doctor if you can take an over-the-counter pain medicine. Do not take two or more pain medicines at the same time unless the doctor told you to. · Some pain is normal with a wound, but do not ignore pain that is getting worse instead of better. You could have an infection. · Your doctor may have closed your wound with stitches (sutures), staples, or skin glue. ¨ If you have stitches, your doctor may remove them after several days to 2 weeks. Or you may have stitches that dissolve on their own.   ¨ If you have staples, your doctor may remove them after 7 to 10 days. ¨ If your wound was closed with skin glue, the glue will wear off in a few days to 2 weeks. When should you call for help? Return to the emergency room now if:  · You have signs of infection, such as:  ¨ Increased pain, swelling, warmth, or redness around the wound. ¨ Red streaks leading from the wound. ¨ Pus draining from the wound. ¨ Swollen lymph nodes in your neck, armpits, or groin. ¨ A fever. · The wound starts to bleed, and blood soaks through the bandage. (Oozing small amounts of blood is normal.)  Call your doctor today if:  · The wound is not getting better each day. Where can you learn more? Go to [employer]be  Enter P907 in the search box to learn more about \"Wound Care: After Your Visit to the Emergency Room. \"   © 2273-8446 [employer]. Care instructions adapted under license by [facility] (which disclaims liability or warranty for this information). This care instruction is for use with your licensed healthcare professional. If you have questions about a medical condition or this instruction, always ask your healthcare professional. [place] [address] any warranty or liability for your use of this information.   Content Version: 9.3.55105; Last Revised: July 22, 2010

## 2019-05-26 NOTE — ED PROVIDER NOTES
EMERGENCY DEPARTMENT HISTORY AND PHYSICAL EXAM      Date: 2019  Patient Name: Jude Judge    History of Presenting Illness     Chief Complaint   Patient presents with    Foreign Body     wood in right foot       History Provided By: Patient    HPI: Jude Judge, 59 y.o. male with PMHx significant for HTN and GERD, presents ambulatory to the ED with cc of wood in his right foot since last night. Patient states that he was walking on his wooden dock at the [facility] point some of it splintered into his foot on the outer edge. Pt went to his PCP today and was given 1g Rocephin IM and she attempted to irrigate it with no complete relief. He states that he was referred here so the splinters could be removed. Denies fever, chills, drainage from the area. PCP: Len Eastman MD    There are no other complaints, changes, or physical findings at this time. Current Outpatient Medications   Medication Sig Dispense Refill    ciprofloxacin HCl (CIPRO) 750 mg tablet Take 1 Tab by mouth two (2) times a day for 10 days. 20 Tab 0    metoprolol tartrate (LOPRESSOR) 25 mg tablet Take 0.5 Tabs by mouth two (2) times a day. Indications: palpitations 30 Tab 0    pantoprazole (PROTONIX) 20 mg tablet Take 20 mg by mouth daily.  LORazepam (ATIVAN) 0.5 mg tablet Take 0.5 mg by mouth as needed. Past History     Past Medical History:  Past Medical History:   Diagnosis Date    Esophageal disorder        Past Surgical History:  Past Surgical History:   Procedure Laterality Date    LIGMT REVISN,KNEE,INTRA/EXTRA-ART         Family History:  No family history on file. Social History:  Social History     Tobacco Use    Smoking status: Former Smoker     Packs/day: 3.00     Years: 20.00     Pack years: 60.00     Last attempt to quit: 1986     Years since quittin.5    Smokeless tobacco: Never Used   Substance Use Topics    Alcohol use:  Yes     Alcohol/week: 3.0 oz     Types: 6 Cans of beer per week     Comment: occas    Drug use: No       Allergies:  No Known Allergies      Review of Systems   Review of Systems   Constitutional: Negative. Negative for activity change, appetite change, chills and fever. Eyes: Negative for pain and visual disturbance. Respiratory: Negative for cough, shortness of breath and wheezing. Cardiovascular: Negative for chest pain and palpitations. Gastrointestinal: Negative for abdominal pain, diarrhea, nausea and vomiting. Genitourinary: Negative for dysuria and hematuria. Musculoskeletal: Negative for arthralgias and myalgias. Skin: Positive for wound. Negative for color change and rash. Neurological: Negative for dizziness and headaches. All other systems reviewed and are negative. Physical Exam   Physical Exam   Constitutional: He is oriented to person, place, and time. He appears well-developed and well-nourished. No distress. 59 y.o.  male in NAD  Communicates appropriately and in full sentences   HENT:   Head: Normocephalic and atraumatic. Eyes: Pupils are equal, round, and reactive to light. Conjunctivae are normal. Right eye exhibits no discharge. Left eye exhibits no discharge. Neck: Normal range of motion. Neck supple. No nuchal rigidity or meningeal signs   Pulmonary/Chest: Effort normal. No respiratory distress. Musculoskeletal: Normal range of motion. He exhibits no edema, tenderness or deformity. Strong DP and PT pulses   Neurological: He is alert and oriented to person, place, and time. Skin: Skin is warm and dry. No rash noted. He is not diaphoretic. There is erythema. No cyanosis. Nails show no clubbing. Psychiatric: He has a normal mood and affect. His behavior is normal.   Nursing note and vitals reviewed. Diagnostic Study Results     Labs -   No results found for this or any previous visit (from the past 12 hour(s)).      Radiologic Studies -   No orders to display     CT Results (Last 48 hours)    None        CXR Results  (Last 48 hours)    None            Medical Decision Making   I am the first provider for this patient. I reviewed the vital signs, available nursing notes, past medical history, past surgical history, family history and social history. Vital Signs-Reviewed the patient's vital signs. Patient Vitals for the past 12 hrs:   Temp Pulse Resp BP SpO2   05/26/19 1155 99.6 °F (37.6 °C) 88 16 152/88 99 %         Records Reviewed: Nursing Notes and Old Medical Records    Provider Notes (Medical Decision Making):   DDX: foreign body, wound care, saltwater exposure    80-year-old male with no history of being immunocompromised or history of MRSA presents with what foreign bodies to his right lateral foot edge since last night. Evaluated previously at primary care doctor's office who attempted to remove the foreign body by cutting open the skin with no complete relief. Patient was given Rocephin 1 g IM prior to arrival.  Performed a foreign body removal procedure but this is complicated as the would that was likely present was extremely wet secondary to the soaking the patient completed at home. Will start patient on Cipro 750 mg p.o. twice daily for 10 days given his salt water exposure. Provided with pain to podiatric follow-up for wound check in the next 2 to 3 days. Counseled to return if he has fevers despite antibiotics. ED Course:   Initial assessment performed. The patients presenting problems have been discussed, and they are in agreement with the care plan formulated and outlined with them. I have encouraged them to ask questions as they arise throughout their visit. Procedure Note - Foreign Body Cavity:  Performed by: King Joanne PA-C   Foreign body was seen in the lateral R foot edge. Procedural sedation was not used. Foreign body removed with hemostat, 1L irrigation, tweezers, and scalpel edge with difficulty.    Estimated blood loss: 2-3 mL  The procedure took 16-30 minutes, and pt tolerated well. DISCHARGE NOTE:  Lima Alfonso  results have been reviewed with him. He has been counseled regarding his diagnosis. He verbally conveys understanding and agreement of the signs, symptoms, diagnosis, treatment and prognosis and additionally agrees to follow up as recommended with Dr. Mayda Howard MD in 24 - 48 hours. He also agrees with the care-plan and conveys that all of his questions have been answered. I have also put together some discharge instructions for him that include: 1) educational information regarding their diagnosis, 2) how to care for their diagnosis at home, as well a 3) list of reasons why they would want to return to the ED prior to their follow-up appointment, should their condition change. He and/or family's questions have been answered. I have encouraged them to see the official results in [facility]\" or to retrieve the specifics of their results from medical records. PLAN:  1. Return precautions as discussed  2. Follow-up with providers as directed  3. Medications as prescribed    Return to ED if worse     Diagnosis     Clinical Impression:   1. Foreign body in right foot, initial encounter    2. Up to date with tetanus toxoid immunization        Discharge Medication List as of 5/26/2019 12:57 PM      START taking these medications    Details   ciprofloxacin HCl (CIPRO) 750 mg tablet Take 1 Tab by mouth two (2) times a day for 10 days. , Normal, Disp-20 Tab, R-0         CONTINUE these medications which have NOT CHANGED    Details   metoprolol tartrate (LOPRESSOR) 25 mg tablet Take 0.5 Tabs by mouth two (2) times a day. Indications: palpitations, Print, Disp-30 Tab, R-0      pantoprazole (PROTONIX) 20 mg tablet Take 20 mg by mouth daily. Historical Med, 20 mg      LORazepam (ATIVAN) 0.5 mg tablet Take 0.5 mg by mouth as needed.   Historical Med, 0.5 mg             Follow-up Information     Follow up With Specialties Details Why Contact Info    Jimena Torres MD [facility] Schedule an appointment as soon as possible for a visit in 2 days As needed, If symptoms worsen, For wound re-check [address] [place] [address]      Eliud Pace [address] Podiatry Call today For wound re-check, Possible further evaluation and treatment [address]  [facility]  [phone]                This note will not be viewable in [address]

## 2020-08-01 ENCOUNTER — APPOINTMENT (OUTPATIENT)
Dept: GENERAL RADIOLOGY | Age: 65
End: 2020-08-01
Attending: EMERGENCY MEDICINE
Payer: MEDICARE

## 2020-08-01 ENCOUNTER — HOSPITAL ENCOUNTER (EMERGENCY)
Age: 65
Discharge: HOME | End: 2020-08-01
Attending: EMERGENCY MEDICINE
Payer: MEDICARE

## 2020-08-01 VITALS
BODY MASS INDEX: 32.13 KG/M2 | HEART RATE: 71 BPM | SYSTOLIC BLOOD PRESSURE: 149 MMHG | HEIGHT: 72 IN | OXYGEN SATURATION: 96 % | WEIGHT: 237.22 LBS | TEMPERATURE: 98.3 F | RESPIRATION RATE: 16 BRPM | DIASTOLIC BLOOD PRESSURE: 81 MMHG

## 2020-08-01 DIAGNOSIS — S20.212A CONTUSION OF RIB ON LEFT SIDE, INITIAL ENCOUNTER: ICD-10-CM

## 2020-08-01 DIAGNOSIS — S27.321A CONTUSION OF LEFT LUNG, INITIAL ENCOUNTER: Primary | ICD-10-CM

## 2020-08-01 LAB
ALBUMIN SERPL-MCNC: 3.7 G/DL (ref 3.5–5)
ALBUMIN/GLOB SERPL: 1.1 {RATIO} (ref 1.1–2.2)
ALP SERPL-CCNC: 67 U/L (ref 45–117)
ALT SERPL-CCNC: 37 U/L (ref 12–78)
ANION GAP SERPL CALC-SCNC: 5 MMOL/L (ref 5–15)
AST SERPL-CCNC: 27 U/L (ref 15–37)
BASOPHILS # BLD: 0 K/UL (ref 0–0.1)
BASOPHILS NFR BLD: 0 % (ref 0–1)
BILIRUB SERPL-MCNC: 0.6 MG/DL (ref 0.2–1)
BUN SERPL-MCNC: 22 MG/DL (ref 6–20)
BUN/CREAT SERPL: 21 (ref 12–20)
CALCIUM SERPL-MCNC: 8.1 MG/DL (ref 8.5–10.1)
CHLORIDE SERPL-SCNC: 107 MMOL/L (ref 97–108)
CO2 SERPL-SCNC: 27 MMOL/L (ref 21–32)
CREAT SERPL-MCNC: 1.05 MG/DL (ref 0.7–1.3)
DIFFERENTIAL METHOD BLD: NORMAL
EOSINOPHIL # BLD: 0.2 K/UL (ref 0–0.4)
EOSINOPHIL NFR BLD: 2 % (ref 0–7)
ERYTHROCYTE [DISTWIDTH] IN BLOOD BY AUTOMATED COUNT: 12.9 % (ref 11.5–14.5)
GLOBULIN SER CALC-MCNC: 3.4 G/DL (ref 2–4)
GLUCOSE SERPL-MCNC: 137 MG/DL (ref 65–100)
HCT VFR BLD AUTO: 41.4 % (ref 36.6–50.3)
HGB BLD-MCNC: 14.2 G/DL (ref 12.1–17)
IMM GRANULOCYTES # BLD AUTO: 0 K/UL (ref 0–0.04)
IMM GRANULOCYTES NFR BLD AUTO: 0 % (ref 0–0.5)
LIPASE SERPL-CCNC: 139 U/L (ref 73–393)
LYMPHOCYTES # BLD: 1.9 K/UL (ref 0.8–3.5)
LYMPHOCYTES NFR BLD: 21 % (ref 12–49)
MCH RBC QN AUTO: 31.4 PG (ref 26–34)
MCHC RBC AUTO-ENTMCNC: 34.3 G/DL (ref 30–36.5)
MCV RBC AUTO: 91.6 FL (ref 80–99)
MONOCYTES # BLD: 0.9 K/UL (ref 0–1)
MONOCYTES NFR BLD: 10 % (ref 5–13)
NEUTS SEG # BLD: 6.1 K/UL (ref 1.8–8)
NEUTS SEG NFR BLD: 67 % (ref 32–75)
NRBC # BLD: 0 K/UL (ref 0–0.01)
NRBC BLD-RTO: 0 PER 100 WBC
PLATELET # BLD AUTO: 258 K/UL (ref 150–400)
PMV BLD AUTO: 9.3 FL (ref 8.9–12.9)
POTASSIUM SERPL-SCNC: 4 MMOL/L (ref 3.5–5.1)
PROT SERPL-MCNC: 7.1 G/DL (ref 6.4–8.2)
RBC # BLD AUTO: 4.52 M/UL (ref 4.1–5.7)
SODIUM SERPL-SCNC: 139 MMOL/L (ref 136–145)
TROPONIN I SERPL-MCNC: <0.05 NG/ML
WBC # BLD AUTO: 9.1 K/UL (ref 4.1–11.1)

## 2020-08-01 PROCEDURE — 99284 EMERGENCY DEPT VISIT MOD MDM: CPT

## 2020-08-01 PROCEDURE — 71101 X-RAY EXAM UNILAT RIBS/CHEST: CPT

## 2020-08-01 PROCEDURE — 36415 COLL VENOUS BLD VENIPUNCTURE: CPT

## 2020-08-01 PROCEDURE — 84484 ASSAY OF TROPONIN QUANT: CPT

## 2020-08-01 PROCEDURE — 96374 THER/PROPH/DIAG INJ IV PUSH: CPT

## 2020-08-01 PROCEDURE — 83690 ASSAY OF LIPASE: CPT

## 2020-08-01 PROCEDURE — 80053 COMPREHEN METABOLIC PANEL: CPT

## 2020-08-01 PROCEDURE — 93005 ELECTROCARDIOGRAM TRACING: CPT

## 2020-08-01 PROCEDURE — 85025 COMPLETE CBC W/AUTO DIFF WBC: CPT

## 2020-08-01 PROCEDURE — 96375 TX/PRO/DX INJ NEW DRUG ADDON: CPT

## 2020-08-01 PROCEDURE — 74011250636 HC RX REV CODE- 250/636: Performed by: EMERGENCY MEDICINE

## 2020-08-01 RX ORDER — FAMOTIDINE 10 MG/1
10 TABLET ORAL DAILY
COMMUNITY

## 2020-08-01 RX ORDER — LIDOCAINE 4 G/100G
1 PATCH TOPICAL EVERY 8 HOURS
Qty: 15 PATCH | Refills: 0 | Status: SHIPPED | OUTPATIENT
Start: 2020-08-01 | End: 2020-08-06

## 2020-08-01 RX ORDER — MORPHINE SULFATE 10 MG/ML
6 INJECTION, SOLUTION INTRAMUSCULAR; INTRAVENOUS ONCE
Status: COMPLETED | OUTPATIENT
Start: 2020-08-01 | End: 2020-08-01

## 2020-08-01 RX ORDER — KETOROLAC TROMETHAMINE 30 MG/ML
15 INJECTION, SOLUTION INTRAMUSCULAR; INTRAVENOUS
Status: COMPLETED | OUTPATIENT
Start: 2020-08-01 | End: 2020-08-01

## 2020-08-01 RX ORDER — HYDROCODONE BITARTRATE AND ACETAMINOPHEN 10; 325 MG/1; MG/1
1 TABLET ORAL
Qty: 18 TAB | Refills: 0 | Status: SHIPPED | OUTPATIENT
Start: 2020-08-01 | End: 2020-08-06

## 2020-08-01 RX ADMIN — KETOROLAC TROMETHAMINE 15 MG: 30 INJECTION, SOLUTION INTRAMUSCULAR at 23:15

## 2020-08-01 RX ADMIN — MORPHINE SULFATE 6 MG: 10 INJECTION INTRAVENOUS at 23:15

## 2020-08-02 LAB
ATRIAL RATE: 89 BPM
CALCULATED P AXIS, ECG09: 34 DEGREES
CALCULATED R AXIS, ECG10: -34 DEGREES
CALCULATED T AXIS, ECG11: 50 DEGREES
DIAGNOSIS, 93000: NORMAL
P-R INTERVAL, ECG05: 190 MS
Q-T INTERVAL, ECG07: 348 MS
QRS DURATION, ECG06: 96 MS
QTC CALCULATION (BEZET), ECG08: 423 MS
VENTRICULAR RATE, ECG03: 89 BPM
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
## 2020-08-02 NOTE — DISCHARGE INSTRUCTIONS
Patient Education        Pulmonary Contusion: Care Instructions  Your Care Instructions  Pulmonary contusion is another name for a bruised lung. A blow to your chest, such as from hitting a car steering wheel or air bag, can bruise your lung. If the injury isn't too bad, you may feel some soreness in your chest and then start to feel better in a few days. If the injury is more serious, you may have a lot of pain from damaged muscles, cartilage, or ribs in your chest. You may even have coughed up blood after the injury. Your doctor may advise you to cough and take deep breaths, even though your chest hurts. Breathing deeply and coughing can help keep the air passages in your lungs open and free of mucus. A bruised lung can take one or more weeks to heal, depending on how badly your lungs were injured. Follow-up care is a key part of your treatment and safety. Be sure to make and go to all appointments, and call your doctor if you are having problems. It's also a good idea to know your test results and keep a list of the medicines you take. How can you care for yourself at home? · Learn how to do controlled coughing to clear mucus from your lungs. Your doctor or nurse can teach you how to do this. · If the doctor gave you an incentive spirometer, use it as instructed. An incentive spirometer is a handheld device that helps you take deep breaths and exercise your lungs. · Take pain medicines exactly as directed. ? If the doctor gave you a prescription medicine for pain, take it as prescribed. ? If you are not taking a prescription pain medicine, ask your doctor if you can take an over-the-counter medicine. When should you call for help? [id number] anytime you think you may need emergency care. For example, call if:  · You have severe trouble breathing. Call your doctor now or seek immediate medical care if:  · You have new or worse trouble breathing. · You have new or worse pain when breathing.   · You cough up blood. · You have a fever. Watch closely for changes in your health, and be sure to contact your doctor if:  · You do not get better as expected. Where can you learn more? Go to http://www.gray.com/  Enter L295 in the search box to learn more about \"Pulmonary Contusion: Care Instructions. \"  Current as of: June 26, 2019               Content Version: 12.5  © 2071-1420 [employer]. Care instructions adapted under license by [employer] (which disclaims liability or warranty for this information). If you have questions about a medical condition or this instruction, always ask your healthcare professional. [place] [address] any warranty or liability for your use of this information. Patient Education        Bruised Rib: Care Instructions  Overview     You can get a bruised rib if you fall or get hit, such as in an accident or while playing sports. The medical term for a bruise is \"contusion. \" Small blood vessels get torn and leak blood under the skin. Most people think of a bruise as a black-and-blue area. But bones and muscles can also get bruised. An injury may damage the rib but not cause a bruise that you can see. Sometimes it can be hard to tell if a rib is bruised or broken. The symptoms may be the same. And a broken bone can't always be seen on an X-ray. But the treatment for a bruised rib is often the same as treatment for a broken one. An injury to the ribs can cause pain. The pain may be worse when you breathe deeply, cough, or sneeze. In most cases, a bruised rib will heal on its own. You can take pain medicine while the rib mends. Pain relief allows you to take deep breaths. Follow-up care is a key part of your treatment and safety. Be sure to make and go to all appointments, and call your doctor if you are having problems. It's also a good idea to know your test results and keep a list of the medicines you take.   How can you care for yourself at home? · Rest and protect the injured or sore area. Stop, change, or take a break from any activity that causes pain. · Put ice or a cold pack on the area for 10 to 20 minutes at a time. Put a thin cloth between the ice and your skin. · After 2 or 3 days, if your swelling is gone, put a heating pad set on low or a warm cloth on your chest. Some doctors suggest that you go back and forth between hot and cold. Put a thin cloth between the heating pad and your skin. · Ask your doctor if you can take an over-the-counter pain medicine, such as acetaminophen (Tylenol), ibuprofen (Advil, Motrin), or naproxen (Aleve). Be safe with medicines. Read and follow all instructions on the label. · As your pain gets better, slowly return to your normal activities. Be patient. Rib bruises can take weeks or months to heal. If the pain gets worse, it may be a sign that you need to rest a while longer. When should you call for help? Call 911 anytime you think you may need emergency care. For example, call if:  · You have severe trouble breathing. Call your doctor now or seek immediate medical care if:  · You have trouble breathing. · You have a fever. · You have a new or worse cough. · You have new or worse pain. Watch closely for changes in your health, and be sure to contact your doctor if:  · You do not get better as expected. Where can you learn more? Go to http://www.gray.com/  Enter R125 in the search box to learn more about \"Bruised Rib: Care Instructions. \"  Current as of: June 26, 2019               Content Version: 12.5  © 3300-6012 [email or web address]. Care instructions adapted under license by [employer] (which disclaims liability or warranty for this information).  If you have questions about a medical condition or this instruction, always ask your healthcare professional. [address] any warranty or liability for your use of this information.

## 2020-08-02 NOTE — ED NOTES
Patient given verbal and written discharge instructions. He was also given education on how to use an incentive spirometer. All questions were answered and the patient verbalized understanding. Patient left the department via wheelchair.

## 2020-08-03 ENCOUNTER — PATIENT OUTREACH (OUTPATIENT)
Dept: CASE MANAGEMENT | Age: 65
End: 2020-08-03

## 2020-08-03 NOTE — PROGRESS NOTES
Patient contacted regarding recent discharge and COVID-19 risk. Discussed COVID-19 related testing which was not done at this time. Test results were not done. Patient informed of results, if available? no    Care Transition Nurse/ Ambulatory Care Manager contacted the patient by telephone to perform post discharge assessment. Verified name and  with patient as identifiers. Patient has following risk factors of: no known risk factors. CTN/ACM reviewed discharge instructions, medical action plan and red flags related to discharge diagnosis. Reviewed and educated them on any new and changed medications related to discharge diagnosis. Advised obtaining a 90-day supply of all daily and as-needed medications. Advance Care Planning:   Does patient have an Advance Directive: not on file     Education provided regarding infection prevention, and signs and symptoms of COVID-19 and when to seek medical attention with patient who verbalized understanding. Discussed exposure protocols and quarantine from [address] you at higher risk for severe illness  and given an opportunity for questions and concerns. The patient agrees to contact the COVID-19 hotline [phone] or PCP office for questions related to their healthcare. CTN/ACM provided contact information for future reference. From CDC: Are you at higher risk for severe illness?  Wash your hands often.  Avoid close contact (6 feet, which is about two arm lengths) with people who are sick.  Put distance between yourself and other people if COVID-19 is spreading in your community.  Clean and disinfect frequently touched surfaces.  Avoid all cruise travel and non-essential air travel.  Call your healthcare professional if you have concerns about COVID-19 and your underlying condition or if you are sick.     For more information on steps you can take to protect yourself, see CDC's How to Protect Yourself      Patient/family/caregiver given information for [employer] Loop and agrees to enroll no  Patient's preferred e-mail:  n/a  Patient's preferred phone number: n/a  Based on Loop alert triggers, patient will be contacted by nurse care manager for worsening symptoms. Plan for follow-up call in 7-14 days based on severity of symptoms and risk factors.

## 2020-08-17 ENCOUNTER — PATIENT OUTREACH (OUTPATIENT)
Dept: CASE MANAGEMENT | Age: 65
End: 2020-08-17

## 2020-08-17 NOTE — PROGRESS NOTES
Patient resolved from Transition of Care episode on 8/17/20  Discussed COVID-19 related testing which was not done at this time. Test results were not done. Patient informed of results, if available? no     Patient/family has been provided the following resources and education related to COVID-19:                         Signs, symptoms and red flags related to COVID-19            CDC exposure and quarantine guidelines            [facility] exposure contact - [phone]            Contact for their local Department of Health                 Patient currently reports that the following symptoms have improved:  no new symptoms. No further outreach scheduled with this CTN/ACM/LPN/HC/ MA. Episode of Care resolved. Patient has this CTN/ACM/LPN/HC/MA contact information if future needs arise.

## 2021-01-15 ENCOUNTER — TRANSCRIBE ORDER (OUTPATIENT)
Dept: SCHEDULING | Age: 66
End: 2021-01-15

## 2021-01-15 DIAGNOSIS — Z13.6 SCREENING FOR ISCHEMIC HEART DISEASE: Primary | ICD-10-CM

## 2021-01-22 ENCOUNTER — HOSPITAL ENCOUNTER (OUTPATIENT)
Dept: ULTRASOUND IMAGING | Age: 66
Discharge: HOME | End: 2021-01-22
Attending: FAMILY MEDICINE
Payer: MEDICARE

## 2021-01-22 DIAGNOSIS — Z13.6 SCREENING FOR ISCHEMIC HEART DISEASE: ICD-10-CM

## 2021-01-22 PROCEDURE — 76706 US ABDL AORTA SCREEN AAA: CPT

## 2021-02-14 ENCOUNTER — HOSPITAL ENCOUNTER (OUTPATIENT)
Dept: PREADMISSION TESTING | Age: 66
Discharge: HOME | End: 2021-02-14
Payer: MEDICARE

## 2021-02-14 LAB — SARS-COV-2, COV2: NORMAL

## 2021-02-14 PROCEDURE — U0003 INFECTIOUS AGENT DETECTION BY NUCLEIC ACID (DNA OR RNA); SEVERE ACUTE RESPIRATORY SYNDROME CORONAVIRUS 2 (SARS-COV-2) (CORONAVIRUS DISEASE [COVID-19]), AMPLIFIED PROBE TECHNIQUE, MAKING USE OF HIGH THROUGHPUT TECHNOLOGIES AS DESCRIBED BY CMS-2020-01-R: HCPCS

## 2021-02-16 LAB — SARS-COV-2, COV2NT: NOT DETECTED

## 2021-02-18 ENCOUNTER — ANESTHESIA EVENT (OUTPATIENT)
Dept: ENDOSCOPY | Age: 66
End: 2021-02-18
Payer: MEDICARE

## 2021-02-18 ENCOUNTER — ANESTHESIA (OUTPATIENT)
Dept: ENDOSCOPY | Age: 66
End: 2021-02-18
Payer: MEDICARE

## 2021-02-18 ENCOUNTER — HOSPITAL ENCOUNTER (OUTPATIENT)
Age: 66
Setting detail: OUTPATIENT SURGERY
Discharge: HOME | End: 2021-02-18
Attending: SPECIALIST | Admitting: SPECIALIST
Payer: MEDICARE

## 2021-02-18 VITALS
BODY MASS INDEX: 33.39 KG/M2 | SYSTOLIC BLOOD PRESSURE: 124 MMHG | TEMPERATURE: 97.7 F | HEART RATE: 62 BPM | DIASTOLIC BLOOD PRESSURE: 74 MMHG | OXYGEN SATURATION: 94 % | HEIGHT: 72 IN | WEIGHT: 246.5 LBS | RESPIRATION RATE: 18 BRPM

## 2021-02-18 PROBLEM — K22.70 BARRETT'S ESOPHAGUS WITHOUT DYSPLASIA: Status: ACTIVE | Noted: 2021-02-18

## 2021-02-18 PROCEDURE — 76040000019: Performed by: SPECIALIST

## 2021-02-18 PROCEDURE — 88305 TISSUE EXAM BY PATHOLOGIST: CPT

## 2021-02-18 PROCEDURE — 2709999900 HC NON-CHARGEABLE SUPPLY: Performed by: SPECIALIST

## 2021-02-18 PROCEDURE — 77030019988 HC FCPS ENDOSC DISP BSC -B: Performed by: SPECIALIST

## 2021-02-18 PROCEDURE — 74011250636 HC RX REV CODE- 250/636: Performed by: NURSE ANESTHETIST, CERTIFIED REGISTERED

## 2021-02-18 PROCEDURE — 74011250636 HC RX REV CODE- 250/636: Performed by: SPECIALIST

## 2021-02-18 PROCEDURE — 76060000031 HC ANESTHESIA FIRST 0.5 HR: Performed by: SPECIALIST

## 2021-02-18 PROCEDURE — 74011000250 HC RX REV CODE- 250: Performed by: NURSE ANESTHETIST, CERTIFIED REGISTERED

## 2021-02-18 RX ORDER — PROPOFOL 10 MG/ML
INJECTION, EMULSION INTRAVENOUS AS NEEDED
Status: DISCONTINUED | OUTPATIENT
Start: 2021-02-18 | End: 2021-02-18 | Stop reason: HOSPADM

## 2021-02-18 RX ORDER — NALOXONE HYDROCHLORIDE 0.4 MG/ML
0.4 INJECTION, SOLUTION INTRAMUSCULAR; INTRAVENOUS; SUBCUTANEOUS
Status: DISCONTINUED | OUTPATIENT
Start: 2021-02-18 | End: 2021-02-18 | Stop reason: HOSPADM

## 2021-02-18 RX ORDER — EPINEPHRINE 0.1 MG/ML
1 INJECTION INTRACARDIAC; INTRAVENOUS
Status: DISCONTINUED | OUTPATIENT
Start: 2021-02-18 | End: 2021-02-18 | Stop reason: HOSPADM

## 2021-02-18 RX ORDER — SODIUM CHLORIDE 9 MG/ML
75 INJECTION, SOLUTION INTRAVENOUS CONTINUOUS
Status: DISCONTINUED | OUTPATIENT
Start: 2021-02-18 | End: 2021-02-18 | Stop reason: HOSPADM

## 2021-02-18 RX ORDER — DEXTROMETHORPHAN/PSEUDOEPHED 2.5-7.5/.8
1.2 DROPS ORAL
Status: DISCONTINUED | OUTPATIENT
Start: 2021-02-18 | End: 2021-02-18 | Stop reason: HOSPADM

## 2021-02-18 RX ORDER — SODIUM CHLORIDE 0.9 % (FLUSH) 0.9 %
5-40 SYRINGE (ML) INJECTION EVERY 8 HOURS
Status: DISCONTINUED | OUTPATIENT
Start: 2021-02-18 | End: 2021-02-18 | Stop reason: HOSPADM

## 2021-02-18 RX ORDER — LIDOCAINE HYDROCHLORIDE 20 MG/ML
INJECTION, SOLUTION EPIDURAL; INFILTRATION; INTRACAUDAL; PERINEURAL AS NEEDED
Status: DISCONTINUED | OUTPATIENT
Start: 2021-02-18 | End: 2021-02-18 | Stop reason: HOSPADM

## 2021-02-18 RX ORDER — MIDAZOLAM HYDROCHLORIDE 1 MG/ML
.25-5 INJECTION, SOLUTION INTRAMUSCULAR; INTRAVENOUS
Status: DISCONTINUED | OUTPATIENT
Start: 2021-02-18 | End: 2021-02-18 | Stop reason: HOSPADM

## 2021-02-18 RX ORDER — FLUMAZENIL 0.1 MG/ML
0.2 INJECTION INTRAVENOUS
Status: DISCONTINUED | OUTPATIENT
Start: 2021-02-18 | End: 2021-02-18 | Stop reason: HOSPADM

## 2021-02-18 RX ORDER — SODIUM CHLORIDE 0.9 % (FLUSH) 0.9 %
5-40 SYRINGE (ML) INJECTION AS NEEDED
Status: DISCONTINUED | OUTPATIENT
Start: 2021-02-18 | End: 2021-02-18 | Stop reason: HOSPADM

## 2021-02-18 RX ORDER — ATROPINE SULFATE 0.1 MG/ML
0.5 INJECTION INTRAVENOUS
Status: DISCONTINUED | OUTPATIENT
Start: 2021-02-18 | End: 2021-02-18 | Stop reason: HOSPADM

## 2021-02-18 RX ADMIN — LIDOCAINE HYDROCHLORIDE 100 MG: 20 INJECTION, SOLUTION EPIDURAL; INFILTRATION; INTRACAUDAL; PERINEURAL at 09:05

## 2021-02-18 RX ADMIN — PROPOFOL 40 MG: 10 INJECTION, EMULSION INTRAVENOUS at 09:12

## 2021-02-18 RX ADMIN — SODIUM CHLORIDE 75 ML/HR: 9 INJECTION, SOLUTION INTRAVENOUS at 08:43

## 2021-02-18 RX ADMIN — PROPOFOL 20 MG: 10 INJECTION, EMULSION INTRAVENOUS at 09:15

## 2021-02-18 RX ADMIN — PROPOFOL 40 MG: 10 INJECTION, EMULSION INTRAVENOUS at 09:08

## 2021-02-18 RX ADMIN — PROPOFOL 100 MG: 10 INJECTION, EMULSION INTRAVENOUS at 09:05

## 2021-02-18 NOTE — ANESTHESIA POSTPROCEDURE EVALUATION
Procedure(s):  ESOPHAGOGASTRODUODENOSCOPY (EGD)  ESOPHAGOGASTRODUODENAL (EGD) BIOPSY. total IV anesthesia, MAC    Anesthesia Post Evaluation      Multimodal analgesia: multimodal analgesia used between 6 hours prior to anesthesia start to PACU discharge  Patient location during evaluation: bedside  Patient participation: complete - patient participated  Level of consciousness: awake  Pain management: adequate  Airway patency: patent  Anesthetic complications: no  Cardiovascular status: acceptable  Respiratory status: acceptable  Hydration status: acceptable  Post anesthesia nausea and vomiting:  controlled  Final Post Anesthesia Temperature Assessment:  Normothermia (36.0-37.5 degrees C)      INITIAL Post-op Vital signs: No vitals data found for the desired time range.

## 2021-02-18 NOTE — PROGRESS NOTES
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 Antony Peña  Assistant(s): Endoscopy Technician-1: Stephan Brandt  Endoscopy RN-1: Addy Marin RN    Specimens:   ID Type Source Tests Collected by Time Destination   1 : GE Junction bx Preservative GE Junction  Ky Haq MD 2/18/2021 1333 Pathology     H. Pylori  yes    Assessment:  Intra-procedure medications     Anesthesia gave intra-procedure sedation and medications, see anesthesia flow sheet no    Intravenous fluids: NS@ KVO     Vital signs stable     Abdominal assessment: round and soft     Recommendation:  Discharge patient per MD order.

## 2021-02-18 NOTE — PROCEDURES
Esophagogastroduodenoscopy    Indications:  Forrester's esophagus    Medications:  See anesthesia form    Assistants:    amy Osborne RN      Post procedure diagnosis:  Forrester's Esophagus, Hiatal Hernia, Duodenitis    Description of Procedure:    Prior to the procedure its objectives, risks, consequences and alternatives were discussed with the patient who then elected to proceed. The Olympus video endoscope was inserted under direct vision into the mouth and then into the esophagus. The esophagus looked normal to the distal esophagus. There was an irregular zline with two one cm tongues of columnar mucosa extending proximally. The z-line was located at 41cm. There was a 2 cm hiatal hernia present with the diaphragm pinch at 43 cm. There were no diagnostic abnormalities of the body, fundus, antrum, cardia and incisura of the stomach. This included direct and retroflexion examination. The first portion of the duodenum had some erythema consistent with duodenitis. The second portion of the duodenum appeared normal.  I took gastric biopsies for hp rapid  I took ge junction biopsies      Complications: There were no apparent complications and the patient tolerated the procedure well.         Implants:  none    Estimated Blood Loss:  none  Specimens Removed:  Stomach hp rapid  ge junction  Impressions:  Forrester's esophagus  Hiatal hernia  duodenitis      Signed By: Naga Duran MD                        February 18, 2021     9:17 AM

## 2021-02-18 NOTE — H&P
Pre-endoscopy H and P    The patient was seen and examined in the endoscopy suite. The airway was assessed and docuemented. The problem list, past medical history, and medications were reviewed. 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    Tobacco use Z72.0    Forrester's esophagus without dysplasia K22.70     Social History     Socioeconomic History    Marital status:      Spouse name: Not on file    Number of children: Not on file    Years of education: Not on file    Highest education level: Not on file   Occupational History    Not on file   Social Needs    Financial resource strain: Not on file    Food insecurity     Worry: Not on file     Inability: Not on file    Transportation needs     Medical: Not on file     Non-medical: Not on file   Tobacco Use    Smoking status: Former Smoker     Packs/day: 3.00     Years: 20.00     Pack years: 60.00     Quit date: 1986     Years since quittin.2    Smokeless tobacco: Never Used   Substance and Sexual Activity    Alcohol use:  Yes     Alcohol/week: 12.0 standard drinks     Types: 12 Cans of beer per week     Comment: weekend    Drug use: No    Sexual activity: Yes     Partners: Female   Lifestyle    Physical activity     Days per week: Not on file     Minutes per session: Not on file    Stress: Not on file   Relationships    Social connections     Talks on phone: Not on file     Gets together: Not on file     Attends Yazidi service: Not on file     Active member of club or organization: Not on file     Attends meetings of clubs or organizations: Not on file     Relationship status: Not on file    Intimate partner violence     Fear of current or ex partner: Not on file     Emotionally abused: Not on file Physically abused: Not on file     Forced sexual activity: Not on file   Other Topics Concern    Not on file   Social History Narrative    Not on file     Past Medical History:   Diagnosis Date    Arthritis     Forrester's esophagus without dysplasia 2/18/2021    2018    Esophageal disorder      The patient has a family history of na    Prior to Admission Medications   Prescriptions Last Dose Informant Patient Reported? Taking? LORazepam (ATIVAN) 0.5 mg tablet Unknown at Unknown time  Yes No   Sig: Take 0.5 mg by mouth as needed (takes only if flying). famotidine (PEPCID) 10 mg tablet 2/17/2021 at Unknown time  Yes Yes   Sig: Take 10 mg by mouth daily. Patient unsure of the dosage but believes it to be 10mg. Facility-Administered Medications: None           The review of systems is:  negative for shortness of breath or chest pain  He notes:  Free bleeding for example with giving blood. The heart, lungs, and mental status were satisfactory for the administration of anesthesia sedation and for the procedure. I discussed with the patient the objectives, risks, consequences and alternatives to the procedure. The patient was counseled at length about the risks of fernando Covid-19 during their perioperative period and any recovery window from their procedure. The patient was made aware that fernando Covid-19  may worsen their prognosis for recovering from their procedure and lend to a higher morbidity and/or mortality risk. All material risks, benefits, and reasonable alternatives including postponing the procedure were discussed. The patient does  wish to proceed with the procedure at this time.         Sowmya Archibald MD  2/18/2021  9:01 AM

## 2021-02-18 NOTE — PROGRESS NOTES
Endoscope was pre-cleaned at the bedside immediately following procedure by [facility].

## 2021-02-18 NOTE — DISCHARGE INSTRUCTIONS
Aman Caicedo  [id number]  1955              Procedure  Discharge Instructions:      Discomfort:  Redness at IV site- apply cold compress to area; if redness or soreness persist- contact your physician  There may be a slight amount of blood passed from the rectum  Gaseous discomfort- walking, belching will help relieve any discomfort  You may not operate a vehicle for 12 hours  You may not engage in an occupation involving machinery or appliances for rest of today  You may not drink alcoholic beverages for at least 12 hours  Avoid making any critical decisions for at least 24 hour  DIET:   You may resume your normal diet today. You should not overeat or \"feast\" today as your abdomen may become distended or uncomfortable. MEDICATIONS:   I reconciled this list from the list you gave us when you came today for the procedure. Please clarify with me, your primary care physician and the nurse who is discharging you if we have any discrepancies. Aspirin and or non-steroidal medication (Ibuprofen, Motrin, naproxen, etc.) is ok in limited quantities. ACTIVITY:  You may resume your normal daily activities it is recommended that you spend the remainder of the day resting -  avoid any strenuous activity. CALL M.D. ANY SIGN OF:  Increasing pain, nausea, vomiting  Abdominal distension (swelling)  New increased bleeding (oral or rectal)  Fever (chills)  Pain in chest area  Bloody discharge from nose or mouth  Shortness of breath          Follow-up Instructions:   Call Dr. Aleta Sultana for the results of  biopsy in approximately one week  Telephone #  [phone]  Follow up visit as needed or as  previously scheduled.       I spoke to Yadira at [address]  9:21 AM  2/18/2021

## 2021-02-18 NOTE — ANESTHESIA PREPROCEDURE EVALUATION
Relevant Problems   CARDIOVASCULAR   (+) Premature atrial contractions--symptomatic   (+) Symptomatic premature ventricular contractions       Anesthetic History   No history of anesthetic complications            Review of Systems / Medical History  Patient summary reviewed and pertinent labs reviewed    Pulmonary  Within defined limits                 Neuro/Psych   Within defined limits           Cardiovascular            Dysrhythmias : PVC      Exercise tolerance: >4 METS  Comments: EKG 2020  Sinus rhythm with sinus arrhythmia   Left axis deviation    GI/Hepatic/Renal     GERD           Endo/Other        Arthritis     Other Findings   Comments: Forrester's esophagus         Physical Exam    Airway  Mallampati: I  TM Distance: > 6 cm  Neck ROM: normal range of motion   Mouth opening: Normal     Cardiovascular  Regular rate and rhythm,  S1 and S2 normal,  no murmur, click, rub, or gallop  Rhythm: regular  Rate: normal         Dental  No notable dental hx       Pulmonary  Breath sounds clear to auscultation               Abdominal  GI exam deferred       Other Findings            Anesthetic Plan    ASA: 2  Anesthesia type: total IV anesthesia and MAC          Induction: Intravenous  Anesthetic plan and risks discussed with: Patient

## 2021-11-17 ENCOUNTER — HOSPITAL ENCOUNTER (OUTPATIENT)
Dept: GENERAL RADIOLOGY | Age: 66
Discharge: HOME | End: 2021-11-17
Payer: MEDICARE

## 2021-11-17 ENCOUNTER — TRANSCRIBE ORDER (OUTPATIENT)
Dept: REGISTRATION | Age: 66
End: 2021-11-17

## 2021-11-17 DIAGNOSIS — R06.2 WHEEZING: ICD-10-CM

## 2021-11-17 DIAGNOSIS — R05.1 ACUTE COUGH: ICD-10-CM

## 2021-11-17 DIAGNOSIS — R05.1 ACUTE COUGH: Primary | ICD-10-CM

## 2021-11-17 PROCEDURE — 71046 X-RAY EXAM CHEST 2 VIEWS: CPT

## 2022-03-18 PROBLEM — K22.70 BARRETT'S ESOPHAGUS WITHOUT DYSPLASIA: Status: ACTIVE | Noted: 2021-02-18

## 2022-03-19 PROBLEM — Z72.0 TOBACCO USE: Status: ACTIVE | Noted: 2017-03-24

## 2023-05-11 RX ORDER — LORAZEPAM 0.5 MG/1
TABLET ORAL PRN
COMMUNITY

## 2023-05-11 RX ORDER — FAMOTIDINE 10 MG
TABLET ORAL DAILY
COMMUNITY

## 2023-08-16 ENCOUNTER — APPOINTMENT (OUTPATIENT)
Facility: HOSPITAL | Age: 68
End: 2023-08-16
Payer: MEDICARE

## 2023-08-16 ENCOUNTER — HOSPITAL ENCOUNTER (EMERGENCY)
Facility: HOSPITAL | Age: 68
Discharge: HOME | End: 2023-08-16
Payer: MEDICARE

## 2023-08-16 VITALS
HEART RATE: 96 BPM | HEIGHT: 72 IN | DIASTOLIC BLOOD PRESSURE: 93 MMHG | SYSTOLIC BLOOD PRESSURE: 137 MMHG | TEMPERATURE: 98.6 F | RESPIRATION RATE: 18 BRPM | BODY MASS INDEX: 32.73 KG/M2 | WEIGHT: 241.62 LBS | OXYGEN SATURATION: 92 %

## 2023-08-16 DIAGNOSIS — S50.852A FOREIGN BODY IN LEFT FOREARM, INITIAL ENCOUNTER: Primary | ICD-10-CM

## 2023-08-16 DIAGNOSIS — L03.114 CELLULITIS OF LEFT UPPER EXTREMITY: ICD-10-CM

## 2023-08-16 PROCEDURE — 90471 IMMUNIZATION ADMIN: CPT

## 2023-08-16 PROCEDURE — 99284 EMERGENCY DEPT VISIT MOD MDM: CPT

## 2023-08-16 PROCEDURE — 90714 TD VACC NO PRESV 7 YRS+ IM: CPT

## 2023-08-16 PROCEDURE — 6360000002 HC RX W HCPCS

## 2023-08-16 PROCEDURE — 73090 X-RAY EXAM OF FOREARM: CPT

## 2023-08-16 RX ORDER — CEPHALEXIN 500 MG/1
500 CAPSULE ORAL 4 TIMES DAILY
Qty: 28 CAPSULE | Refills: 0 | Status: SHIPPED | OUTPATIENT
Start: 2023-08-16 | End: 2023-08-23

## 2023-08-16 RX ADMIN — CLOSTRIDIUM TETANI TOXOID ANTIGEN (FORMALDEHYDE INACTIVATED) AND CORYNEBACTERIUM DIPHTHERIAE TOXOID ANTIGEN (FORMALDEHYDE INACTIVATED) 0.5 ML: 5; 2 INJECTION, SUSPENSION INTRAMUSCULAR at 19:48

## 2023-08-16 ASSESSMENT — PAIN SCALES - GENERAL: PAINLEVEL_OUTOF10: 6

## 2023-08-16 NOTE — ED PROVIDER NOTES
[facility] EMERGENCY DEPT  EMERGENCY DEPARTMENT ENCOUNTER       Pt Name: Lonney Goodpasture  MRN: [id number]  [address] [facility] 1955  Date of evaluation: 2023  Provider: Christiano Bailey PA-C   PCP: Batsheva Tate MD  Note Started: 6:43 PM EDT 23     CHIEF COMPLAINT       Chief Complaint   Patient presents with    Foreign Body in Skin     Patient fell on wooden landing and believes wood got into his left arm. Redness and swelling noted. Tetanus UTD        HISTORY OF PRESENT ILLNESS: 1 or more elements      History From: Patient  HPI Limitations: None     Lonney Goodpasture is a 76 y.o. male who presents for possible foreign body in left forearm x1 week. Patient reports he fell off of his dock into his boat about a week ago, subsequently scraped his arm on the side of the dog and thinks he got a wood foreign body stuck in it. He complains of pain in redness around the site, redness is increasing in size in the last few days. He denies fever, chills, nausea, vomiting, extremity numbness, extremity weakness, decreased wrist range of motion, finger pain, finger swelling. Nursing Notes were all reviewed and agreed with or any disagreements were addressed in the HPI. REVIEW OF SYSTEMS      Review of Systems     Positives and Pertinent negatives as per HPI. PAST HISTORY     Past Medical History:  Past Medical History:   Diagnosis Date    Arthritis     Bran's esophagus without dysplasia 2021    2018    Esophageal disorder        Past Surgical History:  Past Surgical History:   Procedure Laterality Date    HEENT  2021    cataract removed    LIGMT REVISN,KNEE,INTRA/EXTRA-ART      UPPER GI ENDOSCOPY,BIOPSY  2021            Family History:  No family history on file.     Social History:  Social History     Tobacco Use    Smoking status: Former     Packs/day: 3.00     Types: Cigarettes     Quit date: 1986     Years since quittin.7    Smokeless tobacco: Never   Substance Use Topics    Alcohol use: [address] [facility] [address] [phone]    Schedule an appointment as soon as possible for a visit       [facility] EMERGENCY DEPT  Melisa Echeverria  [address] [address] [phone]    As needed, If symptoms worsen       DISCHARGE MEDICATIONS:     Medication List        START taking these medications      cephALEXin 500 MG capsule  Commonly known as: KEFLEX  Take 1 capsule by mouth 4 times daily for 7 days            ASK your doctor about these medications      famotidine 10 MG tablet  Commonly known as: PEPCID     LORazepam 0.5 MG tablet  Commonly known as: ATIVAN               Where to Get Your Medications        These medications were sent to [facility]/pharmacy #[id number]- [address] [phone] [facility] [phone]  [address]      Hours: 24-hours Phone: [phone]   cephALEXin 500 MG capsule           DISCONTINUED MEDICATIONS:  Discharge Medication List as of 8/16/2023  8:16 PM        I have seen and evaluated the patient autonomously. My supervision physician was on site and available for consultation if needed. I am the Primary Clinician of Record. Diallo Chung PA-C (electronically signed)    (Please note that parts of this dictation were completed with voice recognition software. Quite often unanticipated grammatical, syntax, homophones, and other interpretive errors are inadvertently transcribed by the computer software. Please disregards these errors.  Please excuse any errors that have escaped final proofreading.)       Diallo Chung PA-C  08/17/23 7658

## 2024-01-06 ENCOUNTER — HOSPITAL ENCOUNTER (EMERGENCY)
Facility: HOSPITAL | Age: 69
Discharge: HOME | End: 2024-01-06
Attending: EMERGENCY MEDICINE
Payer: MEDICARE

## 2024-01-06 VITALS
DIASTOLIC BLOOD PRESSURE: 84 MMHG | WEIGHT: 230 LBS | HEART RATE: 75 BPM | TEMPERATURE: 98.9 F | OXYGEN SATURATION: 95 % | SYSTOLIC BLOOD PRESSURE: 139 MMHG | BODY MASS INDEX: 31.19 KG/M2 | RESPIRATION RATE: 20 BRPM

## 2024-01-06 DIAGNOSIS — T14.8XXA SKIN AVULSION: Primary | ICD-10-CM

## 2024-01-06 PROCEDURE — 99283 EMERGENCY DEPT VISIT LOW MDM: CPT

## 2024-01-06 RX ORDER — CEPHALEXIN 500 MG/1
500 CAPSULE ORAL 2 TIMES DAILY
Qty: 14 CAPSULE | Refills: 0 | Status: SHIPPED | OUTPATIENT
Start: 2024-01-06 | End: 2024-01-13

## 2024-01-06 ASSESSMENT — PAIN - FUNCTIONAL ASSESSMENT: PAIN_FUNCTIONAL_ASSESSMENT: 0-10

## 2024-01-06 ASSESSMENT — PAIN SCALES - GENERAL: PAINLEVEL_OUTOF10: 5

## 2024-01-06 NOTE — ED PROVIDER NOTES
[facility] EMERGENCY DEPT  EMERGENCY DEPARTMENT HISTORY AND PHYSICAL EXAM      Date: 2024  Patient Name: Jarad Medina  MRN: [id number]  Birthdate 1955  Date of evaluation: 2024  Provider: Trell Michael MD   Note Started: 4:46 PM EST 24    HISTORY OF PRESENT ILLNESS     Chief Complaint   Patient presents with    Finger Injury     Pt cut his index finger to L hand on potato chopper within last hr. Pt denies blood thinners. Pt UTD on TDAP shot.       History Provided By: Patient    HPI: Jarad Medina is a 68 y.o. male who cut his right index finger by shaving off the first few layers of skin with a .  Says it happened just prior to arrival.  He says he had some immediate bleeding that is now subsequently stopped.  He denies any numbness or tingling loss of sensation or loss of motion and exhibits that on exam.  His last tetanus shot was 6 months ago.    PAST MEDICAL HISTORY   Past Medical History:  Past Medical History:   Diagnosis Date    Arthritis     Bran's esophagus without dysplasia 2021    2018    Esophageal disorder        Past Surgical History:  Past Surgical History:   Procedure Laterality Date    HEENT  2021    cataract removed    LIGMT REVISN,KNEE,INTRA/EXTRA-ART      UPPER GI ENDOSCOPY,BIOPSY  2021            Family History:  No family history on file.    Social History:  Social History     Tobacco Use    Smoking status: Former     Current packs/day: 0.00     Types: Cigarettes     Quit date: 1986     Years since quittin.1    Smokeless tobacco: Never   Substance Use Topics    Alcohol use: Yes     Alcohol/week: 12.0 standard drinks of alcohol    Drug use: No       Allergies:  No Known Allergies    PCP: Blake Coy MD    Current Meds:   No current facility-administered medications for this encounter.     Current Outpatient Medications   Medication Sig Dispense Refill    cephALEXin (KEFLEX) 500 MG capsule Take 1 capsule by mouth 2 times daily for 7

## 2024-02-27 ENCOUNTER — APPOINTMENT (OUTPATIENT)
Facility: HOSPITAL | Age: 69
End: 2024-02-27
Payer: MEDICARE

## 2024-02-27 ENCOUNTER — HOSPITAL ENCOUNTER (EMERGENCY)
Facility: HOSPITAL | Age: 69
Discharge: HOME | End: 2024-02-27
Attending: STUDENT IN AN ORGANIZED HEALTH CARE EDUCATION/TRAINING PROGRAM
Payer: MEDICARE

## 2024-02-27 VITALS
SYSTOLIC BLOOD PRESSURE: 124 MMHG | RESPIRATION RATE: 15 BRPM | DIASTOLIC BLOOD PRESSURE: 75 MMHG | OXYGEN SATURATION: 94 % | HEIGHT: 72 IN | WEIGHT: 243.39 LBS | HEART RATE: 88 BPM | TEMPERATURE: 99 F | BODY MASS INDEX: 32.97 KG/M2

## 2024-02-27 DIAGNOSIS — R10.84 GENERALIZED ABDOMINAL PAIN: ICD-10-CM

## 2024-02-27 DIAGNOSIS — R11.2 NAUSEA AND VOMITING, UNSPECIFIED VOMITING TYPE: Primary | ICD-10-CM

## 2024-02-27 LAB
ALBUMIN SERPL-MCNC: 3.8 G/DL (ref 3.5–5)
ALBUMIN/GLOB SERPL: 1 (ref 1.1–2.2)
ALP SERPL-CCNC: 58 U/L (ref 45–117)
ALT SERPL-CCNC: 30 U/L (ref 12–78)
ANION GAP SERPL CALC-SCNC: 3 MMOL/L (ref 5–15)
APPEARANCE UR: CLEAR
AST SERPL-CCNC: 21 U/L (ref 15–37)
BACTERIA URNS QL MICRO: NEGATIVE /HPF
BASOPHILS # BLD: 0 K/UL (ref 0–0.1)
BASOPHILS NFR BLD: 0 % (ref 0–1)
BILIRUB SERPL-MCNC: 1 MG/DL (ref 0.2–1)
BILIRUB UR QL: NEGATIVE
BUN SERPL-MCNC: 22 MG/DL (ref 6–20)
BUN/CREAT SERPL: 19 (ref 12–20)
CALCIUM SERPL-MCNC: 9.2 MG/DL (ref 8.5–10.1)
CHLORIDE SERPL-SCNC: 104 MMOL/L (ref 97–108)
CO2 SERPL-SCNC: 30 MMOL/L (ref 21–32)
COLOR UR: NORMAL
CREAT SERPL-MCNC: 1.14 MG/DL (ref 0.7–1.3)
DIFFERENTIAL METHOD BLD: ABNORMAL
EOSINOPHIL # BLD: 0.2 K/UL (ref 0–0.4)
EOSINOPHIL NFR BLD: 2 % (ref 0–7)
EPITH CASTS URNS QL MICRO: NORMAL /LPF
ERYTHROCYTE [DISTWIDTH] IN BLOOD BY AUTOMATED COUNT: 12.2 % (ref 11.5–14.5)
GLOBULIN SER CALC-MCNC: 3.8 G/DL (ref 2–4)
GLUCOSE SERPL-MCNC: 129 MG/DL (ref 65–100)
GLUCOSE UR STRIP.AUTO-MCNC: NEGATIVE MG/DL
HCT VFR BLD AUTO: 49.8 % (ref 36.6–50.3)
HGB BLD-MCNC: 16.3 G/DL (ref 12.1–17)
HGB UR QL STRIP: NEGATIVE
HYALINE CASTS URNS QL MICRO: NORMAL /LPF (ref 0–2)
IMM GRANULOCYTES # BLD AUTO: 0 K/UL (ref 0–0.04)
IMM GRANULOCYTES NFR BLD AUTO: 0 % (ref 0–0.5)
KETONES UR QL STRIP.AUTO: NEGATIVE MG/DL
LACTATE BLD-SCNC: 1.39 MMOL/L (ref 0.4–2)
LEUKOCYTE ESTERASE UR QL STRIP.AUTO: NEGATIVE
LIPASE SERPL-CCNC: 32 U/L (ref 13–75)
LYMPHOCYTES # BLD: 0.8 K/UL (ref 0.8–3.5)
LYMPHOCYTES NFR BLD: 7 % (ref 12–49)
MCH RBC QN AUTO: 31.1 PG (ref 26–34)
MCHC RBC AUTO-ENTMCNC: 32.7 G/DL (ref 30–36.5)
MCV RBC AUTO: 95 FL (ref 80–99)
MONOCYTES # BLD: 0.5 K/UL (ref 0–1)
MONOCYTES NFR BLD: 5 % (ref 5–13)
NEUTS SEG # BLD: 9.2 K/UL (ref 1.8–8)
NEUTS SEG NFR BLD: 86 % (ref 32–75)
NITRITE UR QL STRIP.AUTO: NEGATIVE
NRBC # BLD: 0 K/UL (ref 0–0.01)
NRBC BLD-RTO: 0 PER 100 WBC
PH UR STRIP: 5.5 (ref 5–8)
PLATELET # BLD AUTO: 277 K/UL (ref 150–400)
PMV BLD AUTO: 9.1 FL (ref 8.9–12.9)
POTASSIUM SERPL-SCNC: 4.3 MMOL/L (ref 3.5–5.1)
PROCALCITONIN SERPL-MCNC: <0.05 NG/ML
PROT SERPL-MCNC: 7.6 G/DL (ref 6.4–8.2)
PROT UR STRIP-MCNC: NEGATIVE MG/DL
RBC # BLD AUTO: 5.24 M/UL (ref 4.1–5.7)
RBC #/AREA URNS HPF: NORMAL /HPF (ref 0–5)
RBC MORPH BLD: ABNORMAL
SODIUM SERPL-SCNC: 137 MMOL/L (ref 136–145)
SP GR UR REFRACTOMETRY: 1.03
TROPONIN I SERPL HS-MCNC: 4 NG/L (ref 0–76)
URINE CULTURE IF INDICATED: NORMAL
UROBILINOGEN UR QL STRIP.AUTO: 0.2 EU/DL (ref 0.2–1)
WBC # BLD AUTO: 10.7 K/UL (ref 4.1–11.1)
WBC URNS QL MICRO: NORMAL /HPF (ref 0–4)

## 2024-02-27 PROCEDURE — 83605 ASSAY OF LACTIC ACID: CPT

## 2024-02-27 PROCEDURE — 84145 PROCALCITONIN (PCT): CPT

## 2024-02-27 PROCEDURE — 83690 ASSAY OF LIPASE: CPT

## 2024-02-27 PROCEDURE — 99285 EMERGENCY DEPT VISIT HI MDM: CPT

## 2024-02-27 PROCEDURE — 96361 HYDRATE IV INFUSION ADD-ON: CPT

## 2024-02-27 PROCEDURE — 85025 COMPLETE CBC W/AUTO DIFF WBC: CPT

## 2024-02-27 PROCEDURE — 6360000004 HC RX CONTRAST MEDICATION: Performed by: STUDENT IN AN ORGANIZED HEALTH CARE EDUCATION/TRAINING PROGRAM

## 2024-02-27 PROCEDURE — 96375 TX/PRO/DX INJ NEW DRUG ADDON: CPT

## 2024-02-27 PROCEDURE — 36415 COLL VENOUS BLD VENIPUNCTURE: CPT

## 2024-02-27 PROCEDURE — A4216 STERILE WATER/SALINE, 10 ML: HCPCS | Performed by: STUDENT IN AN ORGANIZED HEALTH CARE EDUCATION/TRAINING PROGRAM

## 2024-02-27 PROCEDURE — 81001 URINALYSIS AUTO W/SCOPE: CPT

## 2024-02-27 PROCEDURE — 74177 CT ABD & PELVIS W/CONTRAST: CPT

## 2024-02-27 PROCEDURE — 84484 ASSAY OF TROPONIN QUANT: CPT

## 2024-02-27 PROCEDURE — 80053 COMPREHEN METABOLIC PANEL: CPT

## 2024-02-27 PROCEDURE — 2500000003 HC RX 250 WO HCPCS: Performed by: STUDENT IN AN ORGANIZED HEALTH CARE EDUCATION/TRAINING PROGRAM

## 2024-02-27 PROCEDURE — 2580000003 HC RX 258: Performed by: STUDENT IN AN ORGANIZED HEALTH CARE EDUCATION/TRAINING PROGRAM

## 2024-02-27 PROCEDURE — 96374 THER/PROPH/DIAG INJ IV PUSH: CPT

## 2024-02-27 PROCEDURE — 93005 ELECTROCARDIOGRAM TRACING: CPT | Performed by: STUDENT IN AN ORGANIZED HEALTH CARE EDUCATION/TRAINING PROGRAM

## 2024-02-27 RX ORDER — ONDANSETRON 4 MG/1
4 TABLET, FILM COATED ORAL 3 TIMES DAILY PRN
Qty: 10 TABLET | Refills: 0 | Status: SHIPPED | OUTPATIENT
Start: 2024-02-27

## 2024-02-27 RX ORDER — HYDROMORPHONE HYDROCHLORIDE 1 MG/ML
1 INJECTION, SOLUTION INTRAMUSCULAR; INTRAVENOUS; SUBCUTANEOUS
Status: COMPLETED | OUTPATIENT
Start: 2024-02-27 | End: 2024-02-27

## 2024-02-27 RX ORDER — SODIUM CHLORIDE, SODIUM LACTATE, POTASSIUM CHLORIDE, AND CALCIUM CHLORIDE .6; .31; .03; .02 G/100ML; G/100ML; G/100ML; G/100ML
30 INJECTION, SOLUTION INTRAVENOUS ONCE
Status: COMPLETED | OUTPATIENT
Start: 2024-02-27 | End: 2024-02-27

## 2024-02-27 RX ORDER — DICYCLOMINE HCL 20 MG
20 TABLET ORAL
Qty: 12 TABLET | Refills: 0 | Status: SHIPPED | OUTPATIENT
Start: 2024-02-27

## 2024-02-27 RX ORDER — 0.9 % SODIUM CHLORIDE 0.9 %
1000 INTRAVENOUS SOLUTION INTRAVENOUS ONCE
Status: DISCONTINUED | OUTPATIENT
Start: 2024-02-27 | End: 2024-02-27

## 2024-02-27 RX ORDER — FAMOTIDINE 20 MG/1
20 TABLET, FILM COATED ORAL DAILY
Qty: 20 TABLET | Refills: 0 | Status: SHIPPED | OUTPATIENT
Start: 2024-02-27

## 2024-02-27 RX ADMIN — IOPAMIDOL 100 ML: 755 INJECTION, SOLUTION INTRAVENOUS at 16:14

## 2024-02-27 RX ADMIN — HYDROMORPHONE HYDROCHLORIDE 1 MG: 1 INJECTION, SOLUTION INTRAMUSCULAR; INTRAVENOUS; SUBCUTANEOUS at 16:33

## 2024-02-27 RX ADMIN — SODIUM CHLORIDE, POTASSIUM CHLORIDE, SODIUM LACTATE AND CALCIUM CHLORIDE 2328 ML: 600; 310; 30; 20 INJECTION, SOLUTION INTRAVENOUS at 14:36

## 2024-02-27 RX ADMIN — FAMOTIDINE 20 MG: 10 INJECTION, SOLUTION INTRAVENOUS at 16:33

## 2024-02-27 ASSESSMENT — PAIN SCALES - GENERAL: PAINLEVEL_OUTOF10: 10

## 2024-02-27 ASSESSMENT — PAIN DESCRIPTION - LOCATION: LOCATION: ABDOMEN

## 2024-02-27 NOTE — ED PROVIDER NOTES
Where to Get Your Medications        These medications were sent to [facility]/pharmacy #[id number] - [place], [place] - [address] - P [phone] - F [phone] [address] [facility] [place] [address]      Hours: 24-hours Phone: [phone]   dicyclomine 20 MG tablet  famotidine 20 MG tablet  ondansetron 4 MG tablet       2. Blake Coy MD  [address] [facility] [address] [phone]    Schedule an appointment as soon as possible for a visit in 2 days      [facility] EMERGENCY DEPT  [address] [facility] [address] [phone]    As needed, If symptoms worsen    Return to ED if worse     Diagnosis     Clinical Impression: Acute nausea and vomiting, acute abdominal pain               Arline Espino MD  02/27/24 7161

## 2024-02-28 LAB
EKG ATRIAL RATE: 62 BPM
EKG DIAGNOSIS: NORMAL
EKG P AXIS: 20 DEGREES
EKG P-R INTERVAL: 178 MS
EKG Q-T INTERVAL: 392 MS
EKG QRS DURATION: 98 MS
EKG QTC CALCULATION (BAZETT): 397 MS
EKG R AXIS: -43 DEGREES
EKG T AXIS: 16 DEGREES
EKG VENTRICULAR RATE: 62 BPM

## 2024-08-13 ENCOUNTER — ANESTHESIA (OUTPATIENT)
Facility: HOSPITAL | Age: 69
End: 2024-08-13
Payer: MEDICARE

## 2024-08-13 ENCOUNTER — ANESTHESIA EVENT (OUTPATIENT)
Facility: HOSPITAL | Age: 69
End: 2024-08-13
Payer: MEDICARE

## 2024-08-13 ENCOUNTER — HOSPITAL ENCOUNTER (OUTPATIENT)
Facility: HOSPITAL | Age: 69
Setting detail: OUTPATIENT SURGERY
Discharge: HOME | End: 2024-08-13
Attending: SPECIALIST | Admitting: SPECIALIST
Payer: MEDICARE

## 2024-08-13 VITALS
BODY MASS INDEX: 29.88 KG/M2 | TEMPERATURE: 97.8 F | HEIGHT: 72 IN | SYSTOLIC BLOOD PRESSURE: 103 MMHG | RESPIRATION RATE: 16 BRPM | HEART RATE: 51 BPM | DIASTOLIC BLOOD PRESSURE: 74 MMHG | OXYGEN SATURATION: 94 % | WEIGHT: 220.6 LBS

## 2024-08-13 PROCEDURE — 6360000002 HC RX W HCPCS: Performed by: ANESTHESIOLOGY

## 2024-08-13 PROCEDURE — 3700000001 HC ADD 15 MINUTES (ANESTHESIA): Performed by: SPECIALIST

## 2024-08-13 PROCEDURE — 3600007502: Performed by: SPECIALIST

## 2024-08-13 PROCEDURE — 88305 TISSUE EXAM BY PATHOLOGIST: CPT

## 2024-08-13 PROCEDURE — 2709999900 HC NON-CHARGEABLE SUPPLY: Performed by: SPECIALIST

## 2024-08-13 PROCEDURE — 3600007512: Performed by: SPECIALIST

## 2024-08-13 PROCEDURE — 2720000010 HC SURG SUPPLY STERILE: Performed by: SPECIALIST

## 2024-08-13 PROCEDURE — 2580000003 HC RX 258: Performed by: ANESTHESIOLOGY

## 2024-08-13 PROCEDURE — 3700000000 HC ANESTHESIA ATTENDED CARE: Performed by: SPECIALIST

## 2024-08-13 PROCEDURE — 7100000011 HC PHASE II RECOVERY - ADDTL 15 MIN: Performed by: SPECIALIST

## 2024-08-13 PROCEDURE — 7100000010 HC PHASE II RECOVERY - FIRST 15 MIN: Performed by: SPECIALIST

## 2024-08-13 PROCEDURE — 2500000003 HC RX 250 WO HCPCS: Performed by: ANESTHESIOLOGY

## 2024-08-13 RX ORDER — METOPROLOL SUCCINATE 25 MG/1
TABLET, EXTENDED RELEASE ORAL
COMMUNITY

## 2024-08-13 RX ORDER — SODIUM CHLORIDE 9 MG/ML
INJECTION, SOLUTION INTRAVENOUS CONTINUOUS
Status: DISCONTINUED | OUTPATIENT
Start: 2024-08-13 | End: 2024-08-13 | Stop reason: HOSPADM

## 2024-08-13 RX ORDER — SODIUM CHLORIDE 0.9 % (FLUSH) 0.9 %
5-40 SYRINGE (ML) INJECTION EVERY 12 HOURS SCHEDULED
Status: DISCONTINUED | OUTPATIENT
Start: 2024-08-13 | End: 2024-08-13 | Stop reason: HOSPADM

## 2024-08-13 RX ORDER — LIDOCAINE HYDROCHLORIDE 20 MG/ML
INJECTION, SOLUTION EPIDURAL; INFILTRATION; INTRACAUDAL; PERINEURAL PRN
Status: DISCONTINUED | OUTPATIENT
Start: 2024-08-13 | End: 2024-08-13 | Stop reason: SDUPTHER

## 2024-08-13 RX ORDER — SODIUM CHLORIDE 0.9 % (FLUSH) 0.9 %
5-40 SYRINGE (ML) INJECTION PRN
Status: DISCONTINUED | OUTPATIENT
Start: 2024-08-13 | End: 2024-08-13 | Stop reason: HOSPADM

## 2024-08-13 RX ORDER — SODIUM CHLORIDE 9 MG/ML
25 INJECTION, SOLUTION INTRAVENOUS PRN
Status: DISCONTINUED | OUTPATIENT
Start: 2024-08-13 | End: 2024-08-13 | Stop reason: HOSPADM

## 2024-08-13 RX ORDER — SODIUM CHLORIDE 9 MG/ML
INJECTION, SOLUTION INTRAVENOUS CONTINUOUS PRN
Status: DISCONTINUED | OUTPATIENT
Start: 2024-08-13 | End: 2024-08-13 | Stop reason: SDUPTHER

## 2024-08-13 RX ADMIN — SODIUM CHLORIDE: 9 INJECTION, SOLUTION INTRAVENOUS at 08:09

## 2024-08-13 RX ADMIN — PROPOFOL 50 MG: 10 INJECTION, EMULSION INTRAVENOUS at 08:16

## 2024-08-13 RX ADMIN — PROPOFOL 100 MG: 10 INJECTION, EMULSION INTRAVENOUS at 08:11

## 2024-08-13 RX ADMIN — PROPOFOL 100 MG: 10 INJECTION, EMULSION INTRAVENOUS at 08:13

## 2024-08-13 RX ADMIN — LIDOCAINE HYDROCHLORIDE 100 MG: 20 INJECTION, SOLUTION EPIDURAL; INFILTRATION; INTRACAUDAL; PERINEURAL at 08:11

## 2024-08-13 ASSESSMENT — PAIN - FUNCTIONAL ASSESSMENT
PAIN_FUNCTIONAL_ASSESSMENT: NONE - DENIES PAIN
PAIN_FUNCTIONAL_ASSESSMENT: 0-10

## 2024-08-13 NOTE — OP NOTE
[place] [address]                 NAME:  Jarad Medina   :   1955   MRN:   [id number]     Date/Time:  2024 8:25 AM    Esophagogastroduodenoscopy (EGD) Procedure Note    :  Kailash Cody MD    Staff: Circulator: eWndi Cuellar RN  Endoscopy Technician: Alayna Childress     Referring Provider:  Blake Coy MD    Anethesia/Sedation:  MAC anesthesia Propofol    Preoperative diagnosis: Rectal polyp [K62.1]  Diaphragmatic hernia without obstruction or gangrene [K44.9]  Bran's esophagus without dysplasia [K22.70]      Procedure Details     After infom consent was obtained for the procedure, with all risks and benefits of procedure explained the patient was taken to the endoscopy suite and placed in the left lateral decubitus position.  Following sequential administration of sedation as per above, the [id number] gastroscope was inserted into the mouth and advanced under direct vision to second portion of the duodenum.  A careful inspection was made as the gastroscope was withdrawn, including a retroflexed view of the proximal stomach; findings and interventions are described below.      Findings:  Esophagus:Small 2 cm hiatal hernia, Z line at 41 cm, biopsies obtained at 41, 43 cm.  Stomach:normal   Duodenum/jejunum:normal      Therapies:  none    Specimens:  ID Type Source Tests Collected by Time Destination   1 : BX of Distal Esophagus at 43 cm Tissue Esophagus SURGICAL PATHOLOGY Kailash Cody MD 2024 0815    2 : BX of distal esophagus at 41 cm Tissue Esophagus SURGICAL PATHOLOGY Kailash Cody MD 2024 0817               EBL: None    Complications:   None; patient tolerated the procedure well.           Impression:    See Postoperative diagnosis above    Recommendations:  -Acid suppression with a proton pump inhibitor., -Await pathology.    Discharge disposition:  Home in the company of  when able to ambulate    Kailash

## 2024-08-13 NOTE — DISCHARGE INSTRUCTIONS
Jarad KATE Carol  [id number]  1955    EGD DISCHARGE INSTRUCTIONS  Discomfort:  Sore throat- throat lozenges or warm salt water gargle  redness at IV site- apply warm compress to area; if redness or soreness persist- contact your physician  Gaseous discomfort- walking, belching will help relieve any discomfort    DIET  You may resume your regular diet - however -  remember your colon is empty and a heavy meal will produce gas.   Avoid these foods:  vegetables, fried / greasy foods, carbonated drinks  You may not drink alcoholic beverages for at least 12 hours    MEDICATIONS   Regarding Aspirin or Nonsteroidal medications specifically, please see below.    ACTIVITY  You may resume your normal daily activities.   Spend the remainder of the day resting -  avoid any strenuous activity.  You may not operate a vehicle for 12 hours  You may not engage in an occupation involving machinery or appliances for rest of today.  Avoid making any critical decisions for at least 24 hour    CALL M.D.  ANY SIGN OF   Increasing pain, nausea, vomiting  Abdominal distension (swelling)  New increased bleeding (oral or rectal)  Fever (chills)  Pain in chest area  Bloody discharge from nose or mouth  Shortness of breath    You may  take any Advil, Aspirin, Ibuprofen, Motrin, Aleve, or Goody’s for 10 days, ONLY  Tylenol as needed for pain.    Post procedure diagnosis: Bran esophagus  Post-procedure recommendations: Take Pantoprazole 1 tab daily before breakfast (called to pharmacy)    Follow-up Instructions:   Call Dr. Cody  Results of procedure / biopsy in 10 days  Telephone #  [phone]        DISCHARGE SUMMARY from Nurse    The following personal items collected during your admission are returned to you:   Dental Appliance:    Vision:    Hearing Aid:    Jewelry:    Clothing:    Other Valuables:    Valuables sent to safe: Dose (mL/hr) Propofol : *50 mg                 Bran's Esophagus: Care Instructions  Overview     The

## 2024-08-13 NOTE — H&P
Pre-endoscopy H and P     The patient was seen and examined in the endoscopy suite. The airway was assessed and docuemented. The problem list and medications were reviewed.     Patient Active Problem List   Diagnosis    Bran's esophagus without dysplasia    Caffeine abuse, continuous (HCC)    Symptomatic premature ventricular contractions    Ischemic chest pain (HCC)    Premature atrial contractions    Tobacco use    Heart palpitations     Social History     Socioeconomic History    Marital status:      Spouse name: Not on file    Number of children: Not on file    Years of education: Not on file    Highest education level: Not on file   Occupational History    Not on file   Tobacco Use    Smoking status: Former     Current packs/day: 0.00     Types: Cigarettes     Quit date: 1986     Years since quittin.7    Smokeless tobacco: Never   Substance and Sexual Activity    Alcohol use: Yes     Alcohol/week: 6.0 standard drinks of alcohol     Types: 6 Cans of beer per week    Drug use: No    Sexual activity: Not on file   Other Topics Concern    Not on file   Social History Narrative    Not on file     Social Determinants of Health     Financial Resource Strain: Not on file   Food Insecurity: Not on file   Transportation Needs: Not on file   Physical Activity: Not on file   Stress: Not on file   Social Connections: Not on file   Intimate Partner Violence: Not on file   Housing Stability: Not on file     Past Medical History:   Diagnosis Date    Arthritis     Bran's esophagus without dysplasia 2021    2018    Esophageal disorder     Hypertension     Palpitation          Prior to Admission Medications   Prescriptions Last Dose Informant Patient Reported? Taking?   LORazepam (ATIVAN) 0.5 MG tablet Past Month  Yes Yes   Sig: Take by mouth as needed.   dicyclomine (BENTYL) 20 MG tablet Not Taking  No No   Sig: Take 1 tablet by mouth every 6-8 hours as needed (abdominal cramping)   Patient not

## 2024-08-13 NOTE — ANESTHESIA PRE PROCEDURE
Department of Anesthesiology  Preprocedure Note       Name:  Jarad Medina   Age:  69 y.o.  :  1955                                          MRN:  [id number]         Date:  2024      Surgeon: Surgeon(s):  Kailash Cody MD    Procedure: Procedure(s):  ESOPHAGOGASTRODUODENOSCOPY    Medications prior to admission:   Prior to Admission medications    Medication Sig Start Date End Date Taking? Authorizing Provider   metoprolol succinate (TOPROL XL) 25 MG extended release tablet metoprolol succinate ER 25 mg tablet,extended release 24 hr   TAKE 1 TABLET BY MOUTH EVERY DAY FOR 30 DAYS   Yes Provider, MD Luis   LORazepam (ATIVAN) 0.5 MG tablet Take by mouth as needed.   Yes Automatic Reconciliation, Ar   ondansetron (ZOFRAN) 4 MG tablet Take 1 tablet by mouth 3 times daily as needed for Nausea or Vomiting  Patient not taking: Reported on 2024   Arline Montes MD   famotidine (PEPCID) 20 MG tablet Take 1 tablet by mouth daily  Patient not taking: Reported on 2024   Arline Montes MD   dicyclomine (BENTYL) 20 MG tablet Take 1 tablet by mouth every 6-8 hours as needed (abdominal cramping)  Patient not taking: Reported on 2024   Arline Montes MD   famotidine (PEPCID) 10 MG tablet Take by mouth daily  Patient not taking: Reported on 2024    Automatic Reconciliation, Ar       Current medications:    No current facility-administered medications for this encounter.       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

## 2024-08-13 NOTE — ANESTHESIA POSTPROCEDURE EVALUATION
Department of Anesthesiology  Postprocedure Note    Patient: Jarad Medina  MRN: [id number]  YOB: 1955  Date of evaluation: 8/13/2024    Procedure Summary       Date: 08/13/24 Room / Location: [facility] ENDO 04 / [facility] ENDOSCOPY    Anesthesia Start: 0809 Anesthesia Stop: 0819    Procedure: ESOPHAGOGASTRODUODENOSCOPY Diagnosis:       Rectal polyp      Diaphragmatic hernia without obstruction or gangrene      Bran's esophagus without dysplasia      (Rectal polyp [K62.1])      (Diaphragmatic hernia without obstruction or gangrene [K44.9])      (Bran's esophagus without dysplasia [K22.70])    Surgeons: Kailash Cody MD Responsible Provider: Art Srivastava MD    Anesthesia Type: MAC ASA Status: 2            Anesthesia Type: MAC    Lu Phase I: Lu Score: 10    Lu Phase II: Lu Score: 10    Anesthesia Post Evaluation    Patient location during evaluation: bedside  Nausea & Vomiting: no nausea  Cardiovascular status: blood pressure returned to baseline  Respiratory status: acceptable  Hydration status: euvolemic    No notable events documented.

## (undated) DEVICE — ORISE PROKNIFE 1.5 MM ELECTRODE: Brand: ORISE™ PROKNIFE

## (undated) DEVICE — ORISE PROKNIFE 3.0 MM ELECTRODE: Brand: ORISE™ PROKNIFE

## (undated) DEVICE — Z DISCONTINUED PER MEDLINE LINE GAS SAMPLING O2/CO2 LNG AD 13 FT NSL W/ TBNG FILTERLINE

## (undated) DEVICE — 1200 GUARD II KIT W/5MM TUBE W/O VAC TUBE: Brand: GUARDIAN

## (undated) DEVICE — FORCEPS BX L240CM JAW DIA2.4MM ORNG L CAP W/ NDL DISP RAD

## (undated) DEVICE — CATH IV AUTOGRD BC PNK 20GA 25 -- INSYTE

## (undated) DEVICE — BASIN EMSIS 16OZ GRAPHITE PLAS KID SHP MOLD GRAD FOR ORAL

## (undated) DEVICE — Device

## (undated) DEVICE — TOWEL 4 PLY TISS 19X30 SUE WHT

## (undated) DEVICE — SYR 10ML LUER LOK 1/5ML GRAD --

## (undated) DEVICE — SYR 3ML LL TIP 1/10ML GRAD --

## (undated) DEVICE — SET ADMIN 16ML TBNG L100IN 2 Y INJ SITE IV PIGGY BK DISP

## (undated) DEVICE — NEONATAL-ADULT SPO2 SENSOR: Brand: NELLCOR

## (undated) DEVICE — BAG SPEC BIOHZRD 10 X 10 IN --

## (undated) DEVICE — ELECTRODE,RADIOTRANSLUCENT,FOAM,5PK: Brand: MEDLINE

## (undated) DEVICE — FORCEPS BX L160CM DIA8MM GRSP DISECT CUP TIP NONLOCKING ROT

## (undated) DEVICE — BLOCK BITE ENDOSCP AD 21 MM W/ DIL BLU LF DISP

## (undated) DEVICE — TUBING IRRIG COMPATIBLE W ERBE MEDIVATOR PMP HYDR

## (undated) DEVICE — CONTAINER SPEC 20 ML LID NEUT BUFF FORMALIN 10 % POLYPR STS

## (undated) DEVICE — YANKAUER,TAPERED BULBOUS TIP,W/O VENT: Brand: MEDLINE

## (undated) DEVICE — SOLIDIFIER FLD 2OZ 1500CC N DISINF IN BTL DISP SAFESORB

## (undated) DEVICE — NEEDLE HYPO 18GA L1.5IN PNK S STL HUB POLYPR SHLD REG BVL